# Patient Record
Sex: MALE | URBAN - METROPOLITAN AREA
[De-identification: names, ages, dates, MRNs, and addresses within clinical notes are randomized per-mention and may not be internally consistent; named-entity substitution may affect disease eponyms.]

---

## 2020-02-27 ENCOUNTER — TELEPHONE (OUTPATIENT)
Dept: HEMATOLOGY/ONCOLOGY | Facility: HOSPITAL | Age: 70
End: 2020-02-27

## 2020-02-27 NOTE — TELEPHONE ENCOUNTER
Pedro Cohen called and was looking get scheduled with Dr. Steffany Chávez for Leukemia. He is going to reach out to the [place] and have records sent over to us.  I informed him once we received them we will reach out to get him scheduled

## 2020-03-31 ENCOUNTER — TELEPHONE (OUTPATIENT)
Dept: INTERNAL MEDICINE CLINIC | Facility: CLINIC | Age: 70
End: 2020-03-31

## 2020-03-31 NOTE — TELEPHONE ENCOUNTER
Patient received a call today to call back and reschedule his 4/16/20 appointment. He agreed to cancel his appointment and declined to schedule a future appointment at this time.  He plans to call back after 5/1/20 to schedule it

## 2021-03-21 PROBLEM — E66.813 CLASS 3 SEVERE OBESITY IN ADULT: Status: ACTIVE | Noted: 2021-03-21

## 2021-03-21 PROBLEM — M15.0 PRIMARY OSTEOARTHRITIS INVOLVING MULTIPLE JOINTS: Status: ACTIVE | Noted: 2021-03-21

## 2021-03-21 PROBLEM — F10.20 ALCOHOLISM (HCC): Status: ACTIVE | Noted: 2021-03-21

## 2021-03-21 PROBLEM — Z86.19: Status: ACTIVE | Noted: 2021-03-21

## 2021-03-21 PROBLEM — Z85.6 PERSONAL HISTORY OF CLL (CHRONIC LYMPHOCYTIC LEUKEMIA): Status: ACTIVE | Noted: 2021-03-21

## 2021-03-21 PROBLEM — Z87.891 HISTORY OF TOBACCO USE: Status: ACTIVE | Noted: 2021-03-21

## 2021-03-21 PROBLEM — E66.813 CLASS 3 SEVERE OBESITY IN ADULT (HCC): Status: ACTIVE | Noted: 2021-03-21

## 2021-03-21 PROBLEM — I10 ESSENTIAL HYPERTENSION: Status: ACTIVE | Noted: 2021-03-21

## 2021-03-21 PROBLEM — E66.01 CLASS 3 SEVERE OBESITY IN ADULT (HCC): Status: ACTIVE | Noted: 2021-03-21

## 2021-03-21 PROBLEM — F41.1 GENERALIZED ANXIETY DISORDER: Status: ACTIVE | Noted: 2021-03-21

## 2021-03-21 PROBLEM — M15.9 PRIMARY OSTEOARTHRITIS INVOLVING MULTIPLE JOINTS: Status: ACTIVE | Noted: 2021-03-21

## 2025-04-03 ENCOUNTER — APPOINTMENT (OUTPATIENT)
Dept: GENERAL RADIOLOGY | Facility: HOSPITAL | Age: 75
End: 2025-04-03
Attending: EMERGENCY MEDICINE
Payer: MEDICARE

## 2025-04-03 ENCOUNTER — HOSPITAL ENCOUNTER (INPATIENT)
Facility: HOSPITAL | Age: 75
LOS: 4 days | Discharge: HOME | End: 2025-04-07
Attending: EMERGENCY MEDICINE | Admitting: INTERNAL MEDICINE
Payer: MEDICARE

## 2025-04-03 DIAGNOSIS — S81.802S LEG WOUND, LEFT, SEQUELA: ICD-10-CM

## 2025-04-03 DIAGNOSIS — L03.90 CELLULITIS, UNSPECIFIED CELLULITIS SITE: Primary | ICD-10-CM

## 2025-04-03 DIAGNOSIS — S81.801S LEG WOUND, RIGHT, SEQUELA: ICD-10-CM

## 2025-04-03 LAB
ANION GAP SERPL CALC-SCNC: 8 MMOL/L (ref 0–18)
BASOPHILS # BLD: 0 X10(3) UL (ref 0–0.2)
BASOPHILS NFR BLD: 0 %
BUN BLD-MCNC: 17 MG/DL (ref 9–23)
BUN/CREAT SERPL: 14.3 (ref 10–20)
CALCIUM BLD-MCNC: 9.5 MG/DL (ref 8.7–10.4)
CHLORIDE SERPL-SCNC: 101 MMOL/L (ref 98–112)
CO2 SERPL-SCNC: 28 MMOL/L (ref 21–32)
CREAT BLD-MCNC: 1.19 MG/DL
DEPRECATED RDW RBC AUTO: 52.1 FL (ref 35.1–46.3)
EGFRCR SERPLBLD CKD-EPI 2021: 65 ML/MIN/1.73M2 (ref 60–?)
EOSINOPHIL # BLD: 0.73 X10(3) UL (ref 0–0.7)
EOSINOPHIL NFR BLD: 1 %
ERYTHROCYTE [DISTWIDTH] IN BLOOD BY AUTOMATED COUNT: 16.1 % (ref 11–15)
GLUCOSE BLD-MCNC: 94 MG/DL (ref 70–99)
HCT VFR BLD AUTO: 41.4 %
HGB BLD-MCNC: 13.2 G/DL
INR BLD: 1.02 (ref 0.8–1.2)
LACTATE SERPL-SCNC: 1.4 MMOL/L (ref 0.5–2)
LYMPHOCYTES NFR BLD: 61.57 X10(3) UL (ref 1–4)
LYMPHOCYTES NFR BLD: 84 %
MCH RBC QN AUTO: 28 PG (ref 26–34)
MCHC RBC AUTO-ENTMCNC: 31.9 G/DL (ref 31–37)
MCV RBC AUTO: 87.9 FL
MONOCYTES # BLD: 2.2 X10(3) UL (ref 0.1–1)
MONOCYTES NFR BLD: 3 %
NEUTROPHILS # BLD AUTO: 8.4 X10 (3) UL (ref 1.5–7.7)
NEUTROPHILS NFR BLD: 12 %
NEUTS HYPERSEG # BLD: 8.8 X10(3) UL (ref 1.5–7.7)
NT-PROBNP SERPL-MCNC: 530 PG/ML (ref ?–125)
OSMOLALITY SERPL CALC.SUM OF ELEC: 285 MOSM/KG (ref 275–295)
PLATELET # BLD AUTO: 328 10(3)UL (ref 150–450)
POTASSIUM SERPL-SCNC: 4.6 MMOL/L (ref 3.5–5.1)
PROTHROMBIN TIME: 14 SECONDS (ref 11.6–14.8)
RBC # BLD AUTO: 4.71 X10(6)UL
SODIUM SERPL-SCNC: 137 MMOL/L (ref 136–145)
TOTAL CELLS COUNTED BLD: 100
WBC # BLD AUTO: 73.3 X10(3) UL (ref 4–11)

## 2025-04-03 PROCEDURE — 99223 1ST HOSP IP/OBS HIGH 75: CPT | Performed by: INTERNAL MEDICINE

## 2025-04-03 PROCEDURE — 73590 X-RAY EXAM OF LOWER LEG: CPT | Performed by: EMERGENCY MEDICINE

## 2025-04-03 RX ORDER — HYDROCODONE BITARTRATE AND ACETAMINOPHEN 5; 325 MG/1; MG/1
1 TABLET ORAL EVERY 4 HOURS PRN
Status: DISCONTINUED | OUTPATIENT
Start: 2025-04-03 | End: 2025-04-04

## 2025-04-03 RX ORDER — ALBUTEROL SULFATE 90 UG/1
2 INHALANT RESPIRATORY (INHALATION) 3 TIMES DAILY PRN
Status: DISCONTINUED | OUTPATIENT
Start: 2025-04-03 | End: 2025-04-08

## 2025-04-03 RX ORDER — SENNOSIDES 8.6 MG
17.2 TABLET ORAL NIGHTLY PRN
Status: DISCONTINUED | OUTPATIENT
Start: 2025-04-03 | End: 2025-04-08

## 2025-04-03 RX ORDER — HYDROCODONE BITARTRATE AND ACETAMINOPHEN 5; 325 MG/1; MG/1
2 TABLET ORAL EVERY 4 HOURS PRN
Status: DISCONTINUED | OUTPATIENT
Start: 2025-04-03 | End: 2025-04-04

## 2025-04-03 RX ORDER — ACETAMINOPHEN 500 MG
500 TABLET ORAL EVERY 4 HOURS PRN
Status: DISCONTINUED | OUTPATIENT
Start: 2025-04-03 | End: 2025-04-08

## 2025-04-03 RX ORDER — VANCOMYCIN 2 GRAM/500 ML IN 0.9 % SODIUM CHLORIDE INTRAVENOUS
14 ONCE
Status: COMPLETED | OUTPATIENT
Start: 2025-04-03 | End: 2025-04-03

## 2025-04-03 RX ORDER — LOSARTAN POTASSIUM 100 MG/1
100 TABLET ORAL DAILY
Status: DISCONTINUED | OUTPATIENT
Start: 2025-04-03 | End: 2025-04-08

## 2025-04-03 RX ORDER — LORAZEPAM 1 MG/1
1 TABLET ORAL EVERY 6 HOURS PRN
COMMUNITY
Start: 2025-01-31

## 2025-04-03 RX ORDER — MORPHINE SULFATE 4 MG/ML
4 INJECTION, SOLUTION INTRAMUSCULAR; INTRAVENOUS EVERY 2 HOUR PRN
Status: DISCONTINUED | OUTPATIENT
Start: 2025-04-03 | End: 2025-04-04

## 2025-04-03 RX ORDER — FLUTICASONE PROPIONATE 50 MCG
2 SPRAY, SUSPENSION (ML) NASAL DAILY
COMMUNITY
Start: 2025-02-18

## 2025-04-03 RX ORDER — BISACODYL 10 MG
10 SUPPOSITORY, RECTAL RECTAL
Status: DISCONTINUED | OUTPATIENT
Start: 2025-04-03 | End: 2025-04-08

## 2025-04-03 RX ORDER — ACETAMINOPHEN 500 MG
1 TABLET ORAL 3 TIMES DAILY
COMMUNITY
Start: 2024-12-31

## 2025-04-03 RX ORDER — OXYCODONE HYDROCHLORIDE 5 MG/1
5 TABLET ORAL EVERY 4 HOURS PRN
Status: DISCONTINUED | OUTPATIENT
Start: 2025-04-03 | End: 2025-04-08

## 2025-04-03 RX ORDER — ONDANSETRON 2 MG/ML
4 INJECTION INTRAMUSCULAR; INTRAVENOUS EVERY 6 HOURS PRN
Status: DISCONTINUED | OUTPATIENT
Start: 2025-04-03 | End: 2025-04-08

## 2025-04-03 RX ORDER — GABAPENTIN 300 MG/1
300 CAPSULE ORAL 3 TIMES DAILY
Status: DISCONTINUED | OUTPATIENT
Start: 2025-04-03 | End: 2025-04-08

## 2025-04-03 RX ORDER — TRIAMCINOLONE ACETONIDE 1 MG/G
1 CREAM TOPICAL 2 TIMES DAILY
COMMUNITY
Start: 2024-11-12

## 2025-04-03 RX ORDER — CYCLOBENZAPRINE HCL 10 MG
10 TABLET ORAL NIGHTLY PRN
Status: DISCONTINUED | OUTPATIENT
Start: 2025-04-03 | End: 2025-04-08

## 2025-04-03 RX ORDER — ALBUTEROL SULFATE 90 UG/1
2 INHALANT RESPIRATORY (INHALATION) 3 TIMES DAILY PRN
COMMUNITY
Start: 2024-09-03

## 2025-04-03 RX ORDER — CYCLOBENZAPRINE HCL 10 MG
10 TABLET ORAL NIGHTLY PRN
COMMUNITY
Start: 2024-09-25

## 2025-04-03 RX ORDER — LORAZEPAM 1 MG/1
1 TABLET ORAL EVERY 6 HOURS PRN
Status: DISCONTINUED | OUTPATIENT
Start: 2025-04-03 | End: 2025-04-08

## 2025-04-03 RX ORDER — MORPHINE SULFATE 2 MG/ML
2 INJECTION, SOLUTION INTRAMUSCULAR; INTRAVENOUS EVERY 2 HOUR PRN
Status: DISCONTINUED | OUTPATIENT
Start: 2025-04-03 | End: 2025-04-04

## 2025-04-03 RX ORDER — FLUTICASONE PROPIONATE 50 MCG
2 SPRAY, SUSPENSION (ML) NASAL
Status: DISCONTINUED | OUTPATIENT
Start: 2025-04-03 | End: 2025-04-08

## 2025-04-03 RX ORDER — PANTOPRAZOLE SODIUM 40 MG/1
40 TABLET, DELAYED RELEASE ORAL
Status: DISCONTINUED | OUTPATIENT
Start: 2025-04-04 | End: 2025-04-03

## 2025-04-03 RX ORDER — VITAMIN B COMPLEX
1 CAPSULE ORAL EVERY EVENING
COMMUNITY
Start: 2024-12-06

## 2025-04-03 RX ORDER — SODIUM PHOSPHATE, DIBASIC AND SODIUM PHOSPHATE, MONOBASIC 7; 19 G/230ML; G/230ML
1 ENEMA RECTAL ONCE AS NEEDED
Status: DISCONTINUED | OUTPATIENT
Start: 2025-04-03 | End: 2025-04-08

## 2025-04-03 RX ORDER — ACETAMINOPHEN 325 MG/1
650 TABLET ORAL EVERY 4 HOURS PRN
Status: DISCONTINUED | OUTPATIENT
Start: 2025-04-03 | End: 2025-04-04

## 2025-04-03 RX ORDER — FLUTICASONE PROPIONATE 50 MCG
2 SPRAY, SUSPENSION (ML) NASAL DAILY
Status: DISCONTINUED | OUTPATIENT
Start: 2025-04-03 | End: 2025-04-03

## 2025-04-03 RX ORDER — HEPARIN SODIUM 5000 [USP'U]/ML
5000 INJECTION, SOLUTION INTRAVENOUS; SUBCUTANEOUS EVERY 8 HOURS SCHEDULED
Status: DISCONTINUED | OUTPATIENT
Start: 2025-04-03 | End: 2025-04-08

## 2025-04-03 RX ORDER — VALSARTAN 160 MG/1
1 TABLET ORAL DAILY
COMMUNITY
Start: 2024-12-16

## 2025-04-03 RX ORDER — GABAPENTIN 300 MG/1
300 CAPSULE ORAL 3 TIMES DAILY
COMMUNITY
Start: 2025-01-02

## 2025-04-03 RX ORDER — CARBOXYMETHYLCELLULOSE SODIUM 5 MG/ML
1 SOLUTION/ DROPS OPHTHALMIC AS NEEDED
COMMUNITY
Start: 2024-09-25

## 2025-04-03 RX ORDER — PANTOPRAZOLE SODIUM 40 MG/1
40 TABLET, DELAYED RELEASE ORAL EVERY EVENING
Status: DISCONTINUED | OUTPATIENT
Start: 2025-04-03 | End: 2025-04-08

## 2025-04-03 RX ORDER — POLYETHYLENE GLYCOL 3350 17 G/17G
17 POWDER, FOR SOLUTION ORAL DAILY PRN
Status: DISCONTINUED | OUTPATIENT
Start: 2025-04-03 | End: 2025-04-08

## 2025-04-03 NOTE — ED INITIAL ASSESSMENT (HPI)
Brought in by EMS with complaint of infected leg ulcer. Wound on bilateral foot since 1 year. Pt thinks the infection is spreading, redness is spreading right up to his right  lower leg. Hx of HTN, CLL. Ambulatory. A/O x4.

## 2025-04-03 NOTE — ED QUICK NOTES
Orders for admission, patient is aware of plan and ready to go upstairs. Any questions, please call ED SANDRA Xiong  at extension [phone].     Patient Covid vaccination status: Unvaccinated     COVID Test Ordered in ED: None    COVID Suspicion at Admission: N/A    Running Infusions:      Mental Status/LOC at time of transport: A&ox4    Other pertinent information:   CIWA score: N/A   NIH score:  N/A

## 2025-04-03 NOTE — H&P
[facility]  part of [facility]  HISTORY AND PHYSICAL       Tesfaye Cobos Patient Status:  Emergency    1950  74 year old CSN [id number]   Location  Attending Altagracia Beverly MD     PCP None Pcp         DATE OF ADMISSION: 4/3/2025     CHIEF COMPLAINT: Lower extremity pain    HISTORY OF PRESENT ILLNESS  This is a 74 year oldmale who presented complaining of lower extremity pain and swelling.  Patient states he has been having difficulties with lower extremity swelling and cellulitis.  [facility]  Over the past several days patient believed his right lower extremity was looking worse with increasing redness and pain prompting visit to ED for further evaluation.  Patient denied subjective fevers or chills.  Patient otherwise denies current chest pain, shortness of breath, abdominal pain, nausea vomiting.  Of note, patient has history of CLL for which she follows with hematology oncology as outpatient.    PAST MEDICAL HISTORY  Past Medical History:    Alcoholism ([facility])    Class 3 severe obesity in adult ([facility])    2015 70 inches, 275.4 pounds, BMI 40    Generalized anxiety disorder    History of lice    History of tobacco use    Personal history of CLL (chronic lymphocytic leukemia)    Primary osteoarthritis involving multiple joints        PAST SURGICAL HISTORY  History reviewed. No pertinent surgical history.    ALLERGIES   Patient has no known allergies.    MEDICATIONS  Patient's Medications   New Prescriptions    No medications on file   Previous Medications    VALSARTAN 160 MG ORAL TAB    Take 1 tablet (160 mg total) by mouth daily.   Modified Medications    No medications on file   Discontinued Medications    No medications on file       SOCIAL HISTORY  Social History     Socioeconomic History    Marital status: Single       FAMILY HISTORY  History reviewed. No pertinent family history.    PHYSICAL EXAM  Vital  signs:  BP (!) 182/96   Pulse 102   Temp 98.4 °F (36.9 °C) (Oral)   Resp 22   Ht 5' 10\" (1.778 m)   Wt (!) 310 lb (140.6 kg)   SpO2 95%   BMI 44.48 kg/m²      GENERAL:  Awake and alert, in no acute distress.  HEART:  Regular rhythm.  Tachycardia  LUNGS:  Air entry was minimally decreased.  No increased work of breathing or wheezes   ABDOMEN: Soft and non-tender.    EXTREMITIES: Bilateral extremity edema.  Scaling of left foot.  Erythema and warmth of right lower extremity.  PSYCHIATRIC: Normal mood      IMAGING    XR TIBIA + FIBULA (2 VIEWS), LEFT (CPT=73590)    Result Date: 4/3/2025  CONCLUSION: Degenerative changes and soft tissue swelling without acute fracture or dislocation.     Dictated by (CST): Dago Ledezma MD on 4/03/2025 at 10:54 AM     Finalized by (CST): Dago Ledezma MD on 4/03/2025 at 10:54 AM          XR TIBIA + FIBULA (2 VIEWS), RIGHT (CPT=73590)    Result Date: 4/3/2025  CONCLUSION:   Degenerative changes and soft tissue swelling without acute fracture or dislocation.      Dictated by (CST): Dago Ledezma MD on 4/03/2025 at 10:52 AM     Finalized by (CST): Dago Ledezma MD on 4/03/2025 at 10:53 AM            Data:  Recent Labs   Lab 04/03/25  0653   RBC 4.71   HGB 13.2   HCT 41.4   MCV 87.9   MCH 28.0   MCHC 31.9   RDW 16.1*   NEPRELIM 8.40*   WBC 73.3*   .0     Recent Labs   Lab 04/03/25  0653   GLU 94   BUN 17   CREATSERUM 1.19   CA 9.5      K 4.6      CO2 28.0       ASSESSMENT/PLAN      Cellulitis, unspecified cellulitis site  -Patient with ongoing and progressive lower extremity swelling, erythema and warmth.  -Patient states he was recently hospitalized and treated for left lower extremity cellulitis.  -Patient noted development of worsening redness and swelling of right lower extremity  -X-rays reviewed.  Noted soft tissue swelling.  -Starting broad-spectrum antibiotics   -ID consulted, appreciate further recommendations  -Local wound care     Reported history of  restrictive lung disease  -No current signs of acute exacerbation  -Patient describes some mild dyspnea with exertion  -Continue albuterol inhaler as needed  -Continue Flonase  -Continue to monitor    HTN  -Elevations noted  -Restart home ARB  - Monitor and adjust as needed      History of CLL  -Reports he follows with hematology/oncology as outpatient  -WBC of 73      Plan of care discussed with patient at bedside.  Discussed with ED physician and RN. Decision made that pt needs hospitalization for further management/monitoring.      Willam Hough MD    This note was prepared using Dragon Medical voice recognition dictation software. As a result errors may occur. When identified these errors have been corrected. While every attempt is made to correct errors during dictation discrepancies may still exist

## 2025-04-03 NOTE — PLAN OF CARE
Patient arrived to Room from ED.  Oriented to room, caregivers, and functions of unit.  Discussed plan of care for day, including all given medications and their indications. IV Vancomycin and Zosyn maintained.  Redness and edema to bilateral legs noted with chronic wounds per patient.  After multiple attempts, patient did not allow this RN to remove dressings to trace and assess wounds.  Wound consult in place.  Comfort measures encouraged to promote restful environment.     Problem: Patient Centered Care  Goal: Patient preferences are identified and integrated in the patient's plan of care  Description: Interventions:- What would you like us to know as we care for you? I usually get [facility] care at the [facility]    - Provide timely, complete, and accurate information to patient/family  - Incorporate patient and family knowledge, values, beliefs, and cultural backgrounds into the planning and delivery of care  - Encourage patient/family to participate in care and decision-making at the level they choose  - Honor patient and family perspectives and choices  Outcome: Progressing     Problem: Patient/Family Goals  Goal: Patient/Family Long Term Goal  Description: Patient's Long Term Goal: Discharge from hospital    Interventions:  - Inpatient wound consult  - Transition to appropriate discharge regimen for antibiotic therapy  - Improve redness to bilateral legs  - See additional Care Plan goals for specific interventions  Outcome: Progressing  Goal: Patient/Family Short Term Goal  Description: Patient's Short Term Goal: Manage cellulitis to legs    Interventions:   - Infectious disease consultation as indicated  - Maintain IV antibiotic therapy as ordered  - Continue dressing changes to legs per routine  - See additional Care Plan goals for specific interventions  Outcome: Progressing     Problem: PAIN - ADULT  Goal: Verbalizes/displays adequate comfort level or patient's stated pain goal  Description: INTERVENTIONS:-  Encourage pt to monitor pain and request assistance- Assess pain using appropriate pain scale- Administer analgesics based on type and severity of pain and evaluate response- Implement non-pharmacological measures as appropriate and evaluate response- Consider cultural and social influences on pain and pain management- Manage/alleviate anxiety- Utilize distraction and/or relaxation techniques- Monitor for opioid side effects- Notify MD/LIP if interventions unsuccessful or patient reports new pain- Anticipate increased pain with activity and pre-medicate as appropriate  Outcome: Progressing     Problem: RISK FOR INFECTION - ADULT  Goal: Absence of fever/infection during anticipated neutropenic period  Description: INTERVENTIONS- Monitor WBC- Administer growth factors as ordered- Implement neutropenic guidelines  Outcome: Progressing     Problem: SAFETY ADULT - FALL  Goal: Free from fall injury  Description: INTERVENTIONS:- Assess pt frequently for physical needs- Identify cognitive and physical deficits and behaviors that affect risk of falls.- [place] fall precautions as indicated by assessment.- Educate pt/family on patient safety including physical limitations- Instruct pt to call for assistance with activity based on assessment- Modify environment to reduce risk of injury- Provide assistive devices as appropriate- Consider OT/PT consult to assist with strengthening/mobility- Encourage toileting schedule  Outcome: Progressing     Problem: DISCHARGE PLANNING  Goal: Discharge to home or other facility with appropriate resources  Description: INTERVENTIONS:- Identify barriers to discharge w/pt and caregiver- Include patient/family/discharge partner in discharge planning- Arrange for needed discharge resources and transportation as appropriate- Identify discharge learning needs (meds, wound care, etc)- Arrange for interpreters to assist at discharge as needed- Consider post-discharge preferences of  patient/family/discharge partner- Complete POLST form as appropriate- Assess patient's ability to be responsible for managing their own health- Refer to Case Management Department for coordinating discharge planning if the patient needs post-hospital services based on physician/LIP order or complex needs related to functional status, cognitive ability or social support system  Outcome: Progressing     Problem: SKIN/TISSUE INTEGRITY - ADULT  Goal: Skin integrity remains intact  Description: INTERVENTIONS- Assess and document risk factors for pressure ulcer development- Assess and document skin integrity- Monitor for areas of redness and/or skin breakdown- Initiate interventions, skin care algorithm/standards of care as needed  Outcome: Progressing  Goal: Incision(s), wounds(s) or drain site(s) healing without S/S of infection  Description: INTERVENTIONS:- Assess and document risk factors for pressure ulcer development- Assess and document skin integrity- Assess and document dressing/incision, wound bed, drain sites and surrounding tissue- Implement wound care per orders- Initiate isolation precautions as appropriate- Initiate Pressure Ulcer prevention bundle as indicated  Outcome: Progressing     All efforts maintained to promote infection prevention during my assessment and care for this patient.  Stethoscope cleansed and hand hygiene strictly maintained.

## 2025-04-03 NOTE — CONSULTS
[facility]  part of [facility] ID CONSULT NOTE    Tesfaye Cobos Patient Status:  Emergency    1950 MRN [id number]   Location [facility] EMERGENCY DEPARTMENT Attending Altagracia Beverly MD   Hosp Day # 0 PCP None Pcp       Reason for Consultation:  RLE cellulitis    ASSESSMENT:    Antibiotics: Vancomycin, zosyn    # Acute RLE cellulitis in setting of BLE wounds  # CLL    PLAN:  -  Continue on vancomycin and zosyn.  -  Local wound care.  -  Follow fever curve, wbc.  -  Reviewed labs, micro, imaging reports, available old records.  -  Case d/w patient, RN.    History of Present Illness:  Tesfaye Cobos is a 74 year old male with a history of obesity, CLL and reports not currently on treatment, [facility] with a history of hospitalization for LLE cellulitis, who presented to [facility] ED on 4/3 with worsening pain in his legs. States that the wound developed on his right leg about a month ago. Does wear compression wraps. Was putting mupirocin cream on his legs. On arrival, afebrile, XR B tibia with soft tissue swelling,  started on vancomycin and zosyn. ID consulted.    History:  Past Medical History:    Alcoholism ([facility])    Class 3 severe obesity in adult ([facility])    2015 70 inches, 275.4 pounds, BMI 40    Generalized anxiety disorder    History of lice    History of tobacco use    Personal history of CLL (chronic lymphocytic leukemia)    Primary osteoarthritis involving multiple joints     History reviewed. No pertinent surgical history.  History reviewed. No pertinent family history.       Allergies:  Allergies[1]    Medications:    Current Facility-Administered Medications:     vancomycin (Vancocin) 2 g in sodium chloride 0.9% 500mL IVPB premix, 14 mg/kg, Intravenous, Once    Review of Systems:  CONSTITUTIONAL:  No weight loss, weakness or fatigue.  HEENT:  Eyes:  No visual loss, blurred vision, double vision or yellow sclerae. Ears, Nose, Throat:  No hearing  loss, sneezing, congestion, runny nose or sore throat.  SKIN:  No rash or itching.  CARDIOVASCULAR:  No chest pain, chest pressure or chest discomfort  RESPIRATORY:  No shortness of breath, cough or sputum.  GASTROINTESTINAL:  No anorexia, nausea, vomiting or diarrhea. No abdominal pain or blood.  GENITOURINARY:  No Burning on urination.   NEUROLOGICAL:  No headache, dizziness, syncope, paralysis, ataxia, numbness or tingling in the extremities.  MUSCULOSKELETAL:  +BLE pain  HEMATOLOGIC:  No anemia, bleeding or bruising.  ALLERGIES:  No history of asthma, hives, eczema or rhinitis.    Physical Exam:  Vital signs: Blood pressure (!) 182/96, pulse 102, temperature 98.4 °F (36.9 °C), temperature source Oral, resp. rate 22, height 5' 10\" (1.778 m), weight (!) 310 lb (140.6 kg), SpO2 95%.    General: Awake, in bed  HEENT: Moist mucous membranes.   Neck: No lymphadenopathy.  Supple.  Cardiovascular: RRR  Respiratory: CTAB  Abdomen: Soft, nontender, nondistended.   Musculoskeletal: BLE edema, RLE with erythema, warmth, BLE wrapped  Integument: No lesions. No erythema.  Lines: PIV+    Laboratory Data:  Recent Labs   Lab 04/03/25  0653   RBC 4.71   HGB 13.2   HCT 41.4   MCV 87.9   MCH 28.0   MCHC 31.9   RDW 16.1*   NEPRELIM 8.40*   WBC 73.3*   .0   NEUT 12   LYMPH 84   MON 3   EOS 1     Recent Labs   Lab 04/03/25  0653   GLU 94   BUN 17   CREATSERUM 1.19   CA 9.5      K 4.6      CO2 28.0       Microbiology: Reviewed in EMR    Radiology: Reviewed    Thank you for allowing us to participate in the care of this patient. Please do not hesitate to call if you have any questions.   We will continue to follow with you and will make further recommendations based on his progress.    CELENA [facility]  [phone]  4/3/2025           [1] No Known Allergies

## 2025-04-03 NOTE — ED PROVIDER NOTES
Patient Seen in: [facility]         EMERGENCY DEPARTMENT NOTE    Dictated. Voice Transcription software has been utilized for this dictation (the reader should be aware that typographical errors are possible with voice transcription software and to please contact the dictating physician if there are questions.)         History     Chief Complaint   Patient presents with   • Rash Skin Problem     INFECTED LEG ULCER       There may be discrepancies from triage note.     HPI    History provided by patient.  74-year-old male, history of chronic leg wounds complaining of worsening leg wounds and pain.  Patient is a poor historian, and asking his medical history, he reports questionable diabetes.  Reports a history of CLL however states he is currently not receiving treatment a.  He states that 30% of diagnosis are incorrect.  Denies cold feet.  No fevers, chills, nausea, vomiting, diarrhea, constipation, cough, cold symptoms, urinary complaints.  No chest pain, shortness of breath  No headache, neck pain, neck stiffness, incontinence.  No changes in mentation, no changes in vision, no total/new extremity weakness, no total/new extremity paresthesia, no difficulty speaking.  No alleviating or exacerbating factors.  Denies orthopnea, pnd, change in exercise tolerance limited by chest pain/sob  Reports chronic leg swelling, no changes to the swelling.      History reviewed.   Past Medical History:   • Alcoholism ([facility])   • Class 3 severe obesity in adult ([facility])    11/6/2015 70 inches, 275.4 pounds, BMI 40   • Generalized anxiety disorder   • History of lice   • History of tobacco use   • Personal history of CLL (chronic lymphocytic leukemia)   • Primary osteoarthritis involving multiple joints       History reviewed. History reviewed. No pertinent surgical history.      Medications :  Prescriptions Prior to Admission[1]     History reviewed. No pertinent family history.    Smoking Status:   Social History      Socioeconomic History   • Marital status: Single       Review of Systems   Constitutional: Negative.    HENT: Negative.     Eyes: Negative.    Respiratory: Negative.     Cardiovascular: Negative.    Gastrointestinal: Negative.    Genitourinary: Negative.    Musculoskeletal: Negative.    Skin: Negative.    Neurological: Negative.    Endo/Heme/Allergies: Negative.    Psychiatric/Behavioral: Negative.     All other systems reviewed and are negative.    Pertinent positives as listed.  All other organ systems are reviewed and are negative.    Constitutional and vital signs reviewed.      Social History and Family History elements reviewed from today, pertinent positives to the presenting problem noted.    Physical Exam     ED Triage Vitals   BP 04/03/25 0647 (!) 177/97   Pulse 04/03/25 0647 102   Resp 04/03/25 0647 22   Temp 04/03/25 0647 98.4 °F (36.9 °C)   Temp src 04/03/25 0647 Oral   SpO2 04/03/25 0647 97 %   O2 Device 04/03/25 1200 None (Room air)       All measures to prevent infection transmission during my interaction with the patient were taken. The patient was already wearing a droplet mask on my arrival to the room. Personal protective equipment including droplet mask, eye protection, and gloves were worn throughout the duration of the exam.  Handwashing was performed prior to and after the exam.  Stethoscope and any equipment used during my examination was cleaned with super sani-cloth germicidal wipes following the exam.     Physical Exam  Vitals and nursing note reviewed.   Constitutional:       General: He is not in acute distress.     Appearance: He is obese. He is not ill-appearing or toxic-appearing.   Cardiovascular:      Rate and Rhythm: Normal rate and regular rhythm.      Comments: Warm feet bilaterally dorsalis pedis pulse is not palpable however dopplerable;     HR 89  Pulmonary:      Effort: Pulmonary effort is normal. No respiratory distress.      Breath sounds: No stridor. No wheezing,  rhonchi or rales.   Chest:      Chest wall: No tenderness.   Abdominal:      General: There is no distension.      Palpations: Abdomen is soft.      Tenderness: There is no abdominal tenderness. There is no guarding or rebound.      Comments: Negative Newton sign, negative McBurney's point tenderness     Musculoskeletal:      Right lower leg: Edema present.      Left lower leg: Edema present.      Comments: Soft compartments of bilateral lower extremities.  Symmetrical lower extremity edema with chronic skin color changes suggestive of chronic vascular disease.   Skin:     Capillary Refill: Capillary refill takes less than 2 seconds.      Comments: Chronic skin color changes noted to lower extremities       RLE: superficial ulcers noted to right anterior leg with erythema + warmth, no crepitus, no skin sloughing  LLE: Anterior shin ulcer which appears to have good granulation tissue.  No warmth, no erythema, no crepitus   Neurological:      Mental Status: He is alert.      Comments: Gross motor and sensory function intact symmetrically and bilaterally to upper extremities and lower extremities.   Psychiatric:         Mood and Affect: Mood normal.         Behavior: Behavior normal.         Review of prior notes in Care everywhere/Epic performed by myself:  No recent ER visits.      ED Course     If labs obtained, they are personally reviewed by myself:     Labs Reviewed   CBC WITH DIFFERENTIAL WITH PLATELET - Abnormal; Notable for the following components:       Result Value    WBC 73.3 (*)     RDW-SD 52.1 (*)     RDW 16.1 (*)     Neutrophil Absolute Prelim 8.40 (*)     All other components within normal limits   MANUAL DIFFERENTIAL - Abnormal; Notable for the following components:    Neutrophil Absolute Manual 8.80 (*)     Lymphocyte Absolute Manual 61.57 (*)     Monocyte Absolute Manual 2.20 (*)     Eosinophil Absolute Manual 0.73 (*)     All other components within normal limits   BASIC METABOLIC PANEL (8) -  Normal   PROTHROMBIN TIME (PT) - Normal   SCAN SLIDE   MD BLOOD SMEAR CONSULT   LACTIC ACID, PLASMA   PRO BETA NATRIURETIC PEPTIDE   PRO BETA NATRIURETIC PEPTIDE   RAINBOW DRAW LAVENDER   RAINBOW DRAW LIGHT GREEN   RAINBOW DRAW BLUE   RAINBOW DRAW GOLD   BLOOD CULTURE   BLOOD CULTURE       If radiologic studies ordered during today's ER visit, my independent interpretation are seen directly below.  This is awaiting the radiologist's final interpretation.  TR ibia/fibula x-ray,independent interpretation of radiologic study completed by myself and awaiting formal radiologist interpretation:  No acute fracture or dislocation  L Tibia/fibula x-ray,independent interpretation of radiologic study completed by myself and awaiting formal radiologist interpretation:  No acute fracture or dislocation      Imaging Results read by radiology in ED: XR TIBIA + FIBULA (2 VIEWS), LEFT (CPT=73590)    Result Date: 4/3/2025  CONCLUSION: Degenerative changes and soft tissue swelling without acute fracture or dislocation.     Dictated by (CST): Dago Ledezma MD on 4/03/2025 at 10:54 AM     Finalized by (CST): Dago Ledezma MD on 4/03/2025 at 10:54 AM          XR TIBIA + FIBULA (2 VIEWS), RIGHT (CPT=73590)    Result Date: 4/3/2025  CONCLUSION:   Degenerative changes and soft tissue swelling without acute fracture or dislocation.      Dictated by (CST): Dago Ledezma MD on 4/03/2025 at 10:52 AM     Finalized by (CST): Dago Ledezma MD on 4/03/2025 at 10:53 AM               ED Medications Administered:   Medications   vancomycin (Vancocin) 2 g in sodium chloride 0.9% 500mL IVPB premix (2,000 mg Intravenous New Bag 4/3/25 1146)   sodium chloride 0.9 % IV bolus 1,000 mL (has no administration in time range)   piperacillin-tazobactam (Zosyn) 4.5 g in dextrose 5% 100 mL IVPB-ADDV (0 g Intravenous Stopped 4/3/25 1046)           Vitals:    04/03/25 0647 04/03/25 1200 04/03/25 1206   BP: (!) 177/97 (!) 184/99 (!) 182/96   Pulse: 102 101    Resp: 22 19  22   Temp: 98.4 °F (36.9 °C)     TempSrc: Oral     SpO2: 97% 95% 95%   Weight: (!) 140.6 kg     Height: 177.8 cm (5' 10\")       *I personally reviewed and interpreted all ED vitals.    Pulse Ox interpretation by myself: 95%, Room air, Normal     Monitor Interpretation by myself:   normal sinus rhythm    If Ekg obtained during today's visit, it is independently interpreted by myself directly below:      Medical Record Review: I personally reviewed available prior medical records for any recent pertinent discharge summaries, testing, and procedures and reviewed those reports.      [facility]     Medical decision making/ED Course:   74-year-old male with chronic bilateral lower extremity leg wounds who presents to the ER for pain to his legs with worsening wounds.  Right lower extremity with clear cellulitis with overlying erythema and warmth.  Small superficial ulcers noted.  He has a history of CLL.  He reports chronic none crescendoing swelling of his bilateral lower extremities.  I doubt DVT.  He denies chest pain, shortness of breath, no hypoxia to suggest PE.  White blood cell count elevated at 73,000-likely a mixed picture given his known CLL and infection noted today.  Sepsis seems less likely.  Heart rate noted to be sinus tachycardia, low 100s later during patient's emergency room stay the therefore blood cultures and lactic acid ordered.  Has chronic lower extremity edema, will hold 30 cc/kg bolus given fluid overload state.  Will order BNP and will give 1 L of IV fluids at a slow rate.  Tib-fib regions x-rayed with no signs of osteomyelitis.  Patient given vancomycin and Zosyn.      BMP within normal limits.  INR normal.  Case discussed with hospitalist and infectious disease physician who agrees with ER management.    Patient is non toxic appearing, is in no distress, hemodynamically stable.  Pt agrees and is aware of plan.             1345: Sepsis reevaluation completed.  Good cap refill  Differential Diagnosis:   as listed above in medical decision making.   *Please note that in the presenting to the emergency department, illness/injury that poses a threat to life or function is considered during this patient's initial evaluation.    The complexity of this visit is therefore inherently more complex given the need to consider life threatening pathology prior to any other etiology for this patient's visit.    The differential diagnosis and medical decision above exemplify this rationale.       Medical Decision Making  Problems Addressed:  Cellulitis, unspecified cellulitis site: acute illness or injury  Leg wound, left, sequela: acute illness or injury  Leg wound, right, sequela: acute illness or injury    Amount and/or Complexity of Data Reviewed  External Data Reviewed: notes.  Labs: ordered. Decision-making details documented in ED Course.  Radiology: ordered and independent interpretation performed. Decision-making details documented in ED Course.  Discussion of management or test interpretation with external provider(s): Hospitalist  Infectious disease physician    Risk  Drug therapy requiring intensive monitoring for toxicity.  Decision regarding hospitalization.    Critical Care  Total time providing critical care: 33 minutes        ED Course as of 04/03/25 1312  ------------------------------------------------------------  Time: 04/03 0733  Comment: Patient reports his white blood cell count chronically being elevated in the 80,000's due to his CLL.     ------------------------------------------------------------  Time: 04/03 0800  Comment: White blood cell count 73,000.  Likely from patient's known CLL.  Possible component of cellulitis noted on exam.  No fever, no tachypnea, respiratory rate less than 20, heart rate less than 90 will hold septic workup.  ------------------------------------------------------------  Time: 04/03 1310  Comment: Upon reevaluation, heart rate noted to be 107, respiratory rate of 19.   Will send blood cultures and lactic acid.  Given lower extremity edema, will hold 30 cc/kg bolus to prevent further fluid overload.  Will give 1 L of IV fluids at slow rate.  Will add proBNP and chest x-ray to evaluate for fluid overload       Vitals:    04/03/25 0647 04/03/25 1200 04/03/25 1206   BP: (!) 177/97 (!) 184/99 (!) 182/96   Pulse: 102 101    Resp: 22 19 22   Temp: 98.4 °F (36.9 °C)     TempSrc: Oral     SpO2: 97% 95% 95%   Weight: (!) 140.6 kg     Height: 177.8 cm (5' 10\")               Complicating Factors: Significant medical problems that contribute to the complexity of this emergency room evaluation is listed above.    Condition upon leaving the department: Stable    Disposition and Plan     Clinical Impression:  1. Cellulitis, unspecified cellulitis site    2. Leg wound, left, sequela    3. Leg wound, right, sequela        Disposition:  Admit    Medications Prescribed:  Current Discharge Medication List          I have discussed the discharge plan with the patient and/or family or well wisher present in the room with the patient's permission.  They state that they understand and agree with the plan.  All questions regarding their care have been answered prior to discharge.  They are aware: Emergency Department is not intended to be a substitute for an effort to provide complete medical care. The imaging, if any, have often been interpreted on a preliminary basis pending final reading by the radiologist.  Instructed to return immediately to the ED if any changes or worsening of condition should occur.  If patient's blood pressure was greater than 140/90 today, patient encouraged to have this blood pressure rechecked with primary MD and blood pressure education provided.        Hospital Problems       Present on Admission             ICD-10-CM Noted POA    * (Principal) Cellulitis, unspecified cellulitis site L03.90 4/3/2025 Unknown                   [1] (Not in a hospital admission)

## 2025-04-04 LAB
ALBUMIN SERPL-MCNC: 4.6 G/DL (ref 3.2–4.8)
ALBUMIN/GLOB SERPL: 2 {RATIO} (ref 1–2)
ALP LIVER SERPL-CCNC: 109 U/L
ALT SERPL-CCNC: 23 U/L
ANION GAP SERPL CALC-SCNC: 8 MMOL/L (ref 0–18)
AST SERPL-CCNC: 18 U/L (ref ?–34)
BASOPHILS # BLD AUTO: 0.12 X10(3) UL (ref 0–0.2)
BASOPHILS NFR BLD AUTO: 0.2 %
BILIRUB SERPL-MCNC: 0.7 MG/DL (ref 0.2–1.1)
BUN BLD-MCNC: 19 MG/DL (ref 9–23)
BUN/CREAT SERPL: 15.2 (ref 10–20)
CALCIUM BLD-MCNC: 9.4 MG/DL (ref 8.7–10.4)
CHLORIDE SERPL-SCNC: 103 MMOL/L (ref 98–112)
CO2 SERPL-SCNC: 27 MMOL/L (ref 21–32)
CREAT BLD-MCNC: 1.25 MG/DL
DEPRECATED RDW RBC AUTO: 53 FL (ref 35.1–46.3)
EGFRCR SERPLBLD CKD-EPI 2021: 61 ML/MIN/1.73M2 (ref 60–?)
EOSINOPHIL # BLD AUTO: 0.24 X10(3) UL (ref 0–0.7)
EOSINOPHIL NFR BLD AUTO: 0.4 %
ERYTHROCYTE [DISTWIDTH] IN BLOOD BY AUTOMATED COUNT: 16.4 % (ref 11–15)
GLOBULIN PLAS-MCNC: 2.3 G/DL (ref 2–3.5)
GLUCOSE BLD-MCNC: 113 MG/DL (ref 70–99)
HCT VFR BLD AUTO: 39.7 %
HGB BLD-MCNC: 13 G/DL
IMM GRANULOCYTES # BLD AUTO: 0.17 X10(3) UL (ref 0–1)
IMM GRANULOCYTES NFR BLD: 0.3 %
LYMPHOCYTES # BLD AUTO: 55.63 X10(3) UL (ref 1–4)
LYMPHOCYTES NFR BLD AUTO: 84.1 %
MAGNESIUM SERPL-MCNC: 2.2 MG/DL (ref 1.6–2.6)
MCH RBC QN AUTO: 29 PG (ref 26–34)
MCHC RBC AUTO-ENTMCNC: 32.7 G/DL (ref 31–37)
MCV RBC AUTO: 88.4 FL
MONOCYTES # BLD AUTO: 2.25 X10(3) UL (ref 0.1–1)
MONOCYTES NFR BLD AUTO: 3.4 %
NEUTROPHILS # BLD AUTO: 7.74 X10 (3) UL (ref 1.5–7.7)
NEUTROPHILS # BLD AUTO: 7.74 X10(3) UL (ref 1.5–7.7)
NEUTROPHILS NFR BLD AUTO: 11.6 %
OSMOLALITY SERPL CALC.SUM OF ELEC: 289 MOSM/KG (ref 275–295)
PLATELET # BLD AUTO: 300 10(3)UL (ref 150–450)
POTASSIUM SERPL-SCNC: 5 MMOL/L (ref 3.5–5.1)
PROT SERPL-MCNC: 6.9 G/DL (ref 5.7–8.2)
RBC # BLD AUTO: 4.49 X10(6)UL
SODIUM SERPL-SCNC: 138 MMOL/L (ref 136–145)
WBC # BLD AUTO: 66.2 X10(3) UL (ref 4–11)

## 2025-04-04 PROCEDURE — 99233 SBSQ HOSP IP/OBS HIGH 50: CPT | Performed by: INTERNAL MEDICINE

## 2025-04-04 PROCEDURE — G0316 PROLNG IP/OBS E/M EA ADDL 15 MIN: HCPCS | Performed by: INTERNAL MEDICINE

## 2025-04-04 RX ORDER — SILVER SULFADIAZINE 10 MG/G
CREAM TOPICAL DAILY
Status: DISCONTINUED | OUTPATIENT
Start: 2025-04-04 | End: 2025-04-08

## 2025-04-04 RX ORDER — SULFAMETHOXAZOLE AND TRIMETHOPRIM 800; 160 MG/1; MG/1
1 TABLET ORAL 2 TIMES DAILY
Qty: 14 TABLET | Refills: 0 | Status: SHIPPED | OUTPATIENT
Start: 2025-04-04 | End: 2025-04-06

## 2025-04-04 RX ORDER — IBUPROFEN 400 MG/1
400 TABLET, FILM COATED ORAL 3 TIMES DAILY
Status: DISCONTINUED | OUTPATIENT
Start: 2025-04-04 | End: 2025-04-04

## 2025-04-04 RX ORDER — DICLOFENAC EPOLAMINE 0.01 G/1
1 SYSTEM TOPICAL EVERY 12 HOURS
Status: DISCONTINUED | OUTPATIENT
Start: 2025-04-04 | End: 2025-04-04

## 2025-04-04 RX ORDER — CLOTRIMAZOLE AND BETAMETHASONE DIPROPIONATE 10; .64 MG/G; MG/G
CREAM TOPICAL 2 TIMES DAILY
Status: DISCONTINUED | OUTPATIENT
Start: 2025-04-04 | End: 2025-04-08

## 2025-04-04 RX ORDER — ACETAMINOPHEN 500 MG
1000 TABLET ORAL EVERY 8 HOURS PRN
Status: DISCONTINUED | OUTPATIENT
Start: 2025-04-04 | End: 2025-04-08

## 2025-04-04 RX ORDER — IBUPROFEN 600 MG/1
600 TABLET, FILM COATED ORAL 3 TIMES DAILY
Status: DISCONTINUED | OUTPATIENT
Start: 2025-04-04 | End: 2025-04-05

## 2025-04-04 NOTE — PROGRESS NOTES
INFECTIOUS DISEASE PROGRESS NOTE  [facility]  part of [facility] ID PROGRESS NOTE    Tesfaye Cobos Patient Status:  Inpatient    1950 MRN [id number]   Location [facility] 1W Attending Willam Hough MD   Hosp Day # 1 PCP None Pcp     Subjective:  ROS reviewed. States that the night RN did not do the dressing changes correctly. Feels like he needs more cream on his legs and feet. Sitting in chair with legs down.    ASSESSMENT:    Antibiotics: Vancomycin, zosyn     # Acute RLE cellulitis in setting of BLE wounds   -FU blood cx  # CLL     PLAN:  -  Continue on vancomycin and zosyn. Will DC on PO augmentin and bactrim x 7 days.  -  Local wound care. Patient refused wound care RN evaluation this AM.  -  Follow fever curve, wbc.  -  Reviewed labs, micro, imaging reports, available old records.  -  Case d/w patient, pharmacy, RN.     History of Present Illness:  Tesfaye Cobos is a 74 year old male with a history of obesity, CLL and reports not currently on treatment, [facility], with a history of hospitalization for LLE cellulitis, who presented to [facility] ED on 4/3 with worsening pain in his legs. States that the wound developed on his right leg about a month ago. Does wear compression wraps. Was putting mupirocin cream on his legs. On arrival, afebrile, XR B tibia with soft tissue swelling,  started on vancomycin and zosyn. ID consulted.    Physical Exam:  /80 (BP Location: Right arm)   Pulse 92   Temp 97.8 °F (36.6 °C) (Oral)   Resp 16   Ht 5' 10\" (1.778 m)   Wt 277 lb 1.6 oz (125.7 kg)   SpO2 97%   BMI 39.76 kg/m²     Gen:   Awake, in bed  HEENT:  EOMI, neck supple  CV/lungs:  RRR, CTAB  Abdom:  Soft, NT/ND, +BS  Skin/extrem:  BLE wrapped, pictures from  reviewed  Lines:  PIV+    Laboratory Data: Reviewed    Microbiology: Reviewed    Radiology: Reviewed      [facility]  [phone]  2025

## 2025-04-04 NOTE — PROGRESS NOTES
[facility] Pharmacy Dosing Service      Initial Pharmacokinetic Consult for Vancomycin Dosing     Tesfaye Cobos is a 74 year old male who is being initiated on vancomycin therapy for cellulitis.  Pharmacy has been asked to dose vancomycin by Bia Atkinson.  The initial treatment and monitoring approach will be steady state AUC strategy.        Weight and Temperature:    Wt Readings from Last 1 Encounters:   25 125.7 kg (277 lb 1.6 oz)        Temp Readings from Last 1 Encounters:   25 98 °F (36.7 °C) (Oral)      Labs:   Recent Labs   Lab 25  0653   CREATSERUM 1.19      Estimated Creatinine Clearance: 57.9 mL/min (based on SCr of 1.19 mg/dL).     Recent Labs   Lab 25  0653   WBC 73.3*          The Pharmacokinetic Target is:     to 600 mg-h/L and trough <=15 mg/L    Renal Dosing Considerations:    None     Assessment/Plan:   Initial/Loading dose: Has received 2000 mg IV (14 mg/kg, capped at 2250 mg) x 1 loading dose.      Maintenance dose: Pharmacy will dose vancomycin at 1250 mg IV every 24 hours    Monitorin) Plan for vancomycin peak and trough to be obtained in approximately 72 hours    2) Pharmacy will order SCr as clinically indicated to assess renal function.    3) Pharmacy will monitor for toxicity and efficacy, adjust vancomycin dose and/or frequency, and order vancomycin levels as appropriate per the Pharmacy and Therapeutics Committee approved protocol until discontinuation of the medication.       We appreciate the opportunity to assist in the care of this patient.     Evon Lin, PharmD  2025  6:42 AM  [facility]  Pharmacy Extension: [phone]

## 2025-04-04 NOTE — WOUND PROGRESS NOTE
Wound consult received for pt's bilateral lower leg/ankle ulcers-photos reviewed. Pt's rn present during discussion. Pt was sitting up in the chair, feet on the floor. I advised pt who I was and that I was asked to assess his legs. I noted that there was strike through drainage through the dressings which were previously applied. I listened to the pt state that he wanted to be taken to [facility] for his care \"but wound up here\". Pt advised he wanted Vaseline, saline and Mupirocin applied to his ulcers. I explained that his wounds were draining and that type of dressing would add too much moisture at this time. I recommended and showed him silver alginate, ABD pads, non bordered aquacel foam dressing, kerlix, xeroform, and discussed silvadene-all appropriate dressings based on the drainage and red skin and the previous photos I saw. Pt refused everything I offered and told me \"go see someone else, come back tomorrow\". I explained that there is not a wound RN here tomorrow and advised I would let the MD know that he refused my offer of wound care. Bedside RN aware, Dr. Hough secure chat sent.

## 2025-04-04 NOTE — PROGRESS NOTES
Pt is adamantly refusing to have dressings to BLE changed and traced. States \"you don't have the right supplies I don't want you to make it worse\". Explained rationale several times that we need to trace and examine his wounds in order to get a baseline. Showed pt that we have appropriate supplies to care for his wounds. Pt still refusing at this time. Wound on consult and will evaluate pt in the AM. Dr. Hough aware.

## 2025-04-04 NOTE — PHYSICAL THERAPY NOTE
PT orders received and chart reviewed. RN approved activity. Patient sitting in bedside chair, declining participation in therapeutic evaluation at this time due to increased BLE pain. Will re-schedule evaluation for tomorrow.     Jenna Haase, PT, DPT  [facility]  Ext: [phone]

## 2025-04-04 NOTE — OCCUPATIONAL THERAPY NOTE
Attempted OT evaluation. Pt in chair getting up with staff PRN with walker. Refused OT evaluation due to pain; states he only will move when he has to. Will re-attempt as able.        Amrita Thompson OT  [facility]  Inpatient Rehabilitation  Occupational Therapy  [phone]

## 2025-04-04 NOTE — PROGRESS NOTES
[facility]  part of [facility]     Hospitalist Progress Note     Tesfaye Cobos Patient Status:  Inpatient    1950 MRN [id number]   Location [facility] 1W Attending Willam Hough MD   Hosp Day # 1 PCP None Pcp     Chief Complaint:   Chief Complaint   Patient presents with    Rash Skin Problem     INFECTED LEG ULCER        Subjective:     Patient seen sitting in chair.  No family at bedside.  Patient denies no acute events overnight.  Patient reports having some increased pain in legs today.  Believes erythema to be similar to previous.  Patient expressed concerns about wound care dressing recommendations.  States this is not how they are previously done.  Declined treatment by wound care nurses and    Objective:      Vital signs:  Vitals:    25 1855 25 2047 25 0551 25 0841   BP: (!) 174/94 159/80 149/80 159/80   BP Location: Right arm Right arm Right arm Right arm   Pulse: 92 104 83 92   Resp:  18 18 16   Temp: 97.5 °F (36.4 °C) 97.5 °F (36.4 °C) 98 °F (36.7 °C) 97.8 °F (36.6 °C)   TempSrc: Oral Oral Oral Oral   SpO2:  98% 97% 97%   Weight:       Height:           Intake/Output Summary (Last 24 hours) at 2025 1153  Last data filed at 2025 0110  Gross per 24 hour   Intake 1600 ml   Output --   Net 1600 ml           Physical Exam:    GENERAL:  Awake and alert, in no acute distress.  HEART:  Regular rhythm.  Regular rate  LUNGS:  Air entry was minimally decreased.  No increased work of breathing or wheezes   ABDOMEN: Soft and non-tender.    EXTREMITIES: Bilateral lower extremity edema.  Scaling of left foot.  Erythema and warmth of right lower extremity.  Scattered areas of weeping.  No signs of purulent drainage.  PSYCHIATRIC: Normal mood    Diagnostic Data:    Labs:    Recent Labs   Lab 25  0653 25  0752   WBC 73.3* 66.2*   HGB 13.2 13.0   MCV 87.9 88.4   .0 300.0   INR 1.02  --        Recent Labs   Lab 25  0653  04/04/25  0752   GLU 94 113*   BUN 17 19   CREATSERUM 1.19 1.25   CA 9.5 9.4   ALB  --  4.6    138   K 4.6 5.0    103   CO2 28.0 27.0   ALKPHO  --  109   AST  --  18   ALT  --  23   BILT  --  0.7   TP  --  6.9           Estimated Creatinine Clearance: 55.2 mL/min (based on SCr of 1.25 mg/dL).    Recent Labs   Lab 04/03/25  0653   PTP 14.0   INR 1.02            COVID-19  No results found for: \"COVID19\"    Pro-Calcitonin  No results for input(s): \"PCT\" in the last 168 hours.    Cardiac  Recent Labs   Lab 04/03/25  0653   PBNP 530*       Inflammatory Markers  No results for input(s): \"CRP\", \"MODESTA\", \"LDH\", \"DDIMER\" in the last 168 hours.    Culture:  No results found for this visit on 04/03/25.    XR TIBIA + FIBULA (2 VIEWS), LEFT (CPT=73590)    Result Date: 4/3/2025  CONCLUSION: Degenerative changes and soft tissue swelling without acute fracture or dislocation.     Dictated by (CST): Dago Ledezma MD on 4/03/2025 at 10:54 AM     Finalized by (CST): Dago Ledezma MD on 4/03/2025 at 10:54 AM          XR TIBIA + FIBULA (2 VIEWS), RIGHT (CPT=73590)    Result Date: 4/3/2025  CONCLUSION:   Degenerative changes and soft tissue swelling without acute fracture or dislocation.      Dictated by (CST): Dago Ledezma MD on 4/03/2025 at 10:52 AM     Finalized by (CST): Dago Ledezma MD on 4/03/2025 at 10:53 AM                 Medications:    vancomycin  10 mg/kg Intravenous Q24H    diclofenac  1 patch Topical 2 times per day    silver sulfADIAZINE   Topical Daily    gabapentin  300 mg Oral TID    losartan  100 mg Oral Daily    piperacillin-tazobactam  3.375 g Intravenous Q8H    heparin  5,000 Units Subcutaneous Q8H MARCOS    pantoprazole  40 mg Oral QPM       Assessment & Plan:       Cellulitis, unspecified cellulitis site  -Patient with ongoing and progressive lower extremity swelling, erythema and warmth.  -Patient states he was recently hospitalized and treated for left lower extremity cellulitis.  -Patient noted development  of worsening redness and swelling of right lower extremity  -X-rays reviewed.  Noted soft tissue swelling.  - Continuing broad-spectrum antibiotics   -Follow-up blood cultures  -ID consulted, appreciate further recommendations  -Local wound care, of note, patient refusing wound care nurses recommendations.  Continue dressings as patient will allow.     Reported history of restrictive lung disease  -No current signs of acute exacerbation  -Patient describes some mild dyspnea with exertion  -Continue albuterol inhaler as needed  -Continue Flonase  -Continue to monitor     HTN  - Mild elevations noted  -Possible related to pain  - adjusting pain regimen as above  - Continue ARB  - Monitor and adjust as needed        History of CLL  -Reports he follows with hematology/oncology as outpatient  -WBC of 66      Plan of care discussed with patient  at bedside . Discussed management/test result(s) with Rn and ID consultant    Quality:  DVT Prophylaxis: Heparin  CODE status: Full  Estimated date of discharge: TBD  Discharge is dependent on: clinical stability    80 minutes spent discussing with other providers, examining patient, obtaining history, reviewing medical records, interpreting and communicating test results/imaging, ordering tests/medications, discussing plan of care and documenting information.      Willam Hough MD          This note was prepared using Dragon Medical voice recognition dictation software. As a result errors may occur. When identified these errors have been corrected. While every attempt is made to correct errors during dictation discrepancies may still exist

## 2025-04-04 NOTE — PROGRESS NOTES
Pt finally agreeable to have dressings changed. Refused to have RN measure as \"you can't do that without it hurting\". Explained to pt I would be extremely gentle and not even touch the wound bed. Pt still refusing measurements. Did allow for picture to be taken. Pictures linked in flowsheets. Pt very upset that \"you aren't using vaseline like I always do\". Explained to pt that our protocol is to perform wet to dry dressings on open wounds pending additional orders from wound care RN or MD. Pt agreeable for wound consult for additional recommendations in AM.

## 2025-04-04 NOTE — PLAN OF CARE
Pt is A&Ox 4 - anxious and forgetful at times, stated having a \"foggy brain\", room air, tolerating regular diet, voiding freely, ambulating standby assist with a walker. Pt wound care completed. Pt refusing PO [place]/Oxy stating \"those dont work for me\", MD made aware and ordered PO Motrin and Acetaminophen. Call light within reach, calls appropriately, I-bed awareness/alarm on.       Problem: Patient Centered Care  Goal: Patient preferences are identified and integrated in the patient's plan of care  Description: Interventions:- What would you like us to know as we care for you? I usually get my care at the [facility]    - Provide timely, complete, and accurate information to patient/family  - Incorporate patient and family knowledge, values, beliefs, and cultural backgrounds into the planning and delivery of care  - Encourage patient/family to participate in care and decision-making at the level they choose  - Honor patient and family perspectives and choices  Outcome: Progressing     Problem: Patient/Family Goals  Goal: Patient/Family Long Term Goal  Description: Patient's Long Term Goal: Discharge from hospital    Interventions:  - Inpatient wound consult  - Transition to appropriate discharge regimen for antibiotic therapy  - Improve redness to bilateral legs  - See additional Care Plan goals for specific interventions  Outcome: Progressing  Goal: Patient/Family Short Term Goal  Description: Patient's Short Term Goal: Manage cellulitis to legs    Interventions:   - Infectious disease consultation as indicated  - Maintain IV antibiotic therapy as ordered  - Continue dressing changes to legs per routine  - See additional Care Plan goals for specific interventions  Outcome: Progressing     Problem: PAIN - ADULT  Goal: Verbalizes/displays adequate comfort level or patient's stated pain goal  Description: INTERVENTIONS:- Encourage pt to monitor pain and request assistance- Assess pain using appropriate pain scale-  Administer analgesics based on type and severity of pain and evaluate response- Implement non-pharmacological measures as appropriate and evaluate response- Consider cultural and social influences on pain and pain management- Manage/alleviate anxiety- Utilize distraction and/or relaxation techniques- Monitor for opioid side effects- Notify MD/LIP if interventions unsuccessful or patient reports new pain- Anticipate increased pain with activity and pre-medicate as appropriate  Outcome: Progressing     Problem: RISK FOR INFECTION - ADULT  Goal: Absence of fever/infection during anticipated neutropenic period  Description: INTERVENTIONS- Monitor WBC- Administer growth factors as ordered- Implement neutropenic guidelines  Outcome: Progressing     Problem: SAFETY ADULT - FALL  Goal: Free from fall injury  Description: INTERVENTIONS:- Assess pt frequently for physical needs- Identify cognitive and physical deficits and behaviors that affect risk of falls.- [place] fall precautions as indicated by assessment.- Educate pt/family on patient safety including physical limitations- Instruct pt to call for assistance with activity based on assessment- Modify environment to reduce risk of injury- Provide assistive devices as appropriate- Consider OT/PT consult to assist with strengthening/mobility- Encourage toileting schedule  Outcome: Progressing     Problem: DISCHARGE PLANNING  Goal: Discharge to home or other facility with appropriate resources  Description: INTERVENTIONS:- Identify barriers to discharge w/pt and caregiver- Include patient/family/discharge partner in discharge planning- Arrange for needed discharge resources and transportation as appropriate- Identify discharge learning needs (meds, wound care, etc)- Arrange for interpreters to assist at discharge as needed- Consider post-discharge preferences of patient/family/discharge partner- Complete POLST form as appropriate- Assess patient's ability to be responsible  for managing their own health- Refer to Case Management Department for coordinating discharge planning if the patient needs post-hospital services based on physician/LIP order or complex needs related to functional status, cognitive ability or social support system  Outcome: Progressing     Problem: SKIN/TISSUE INTEGRITY - ADULT  Goal: Skin integrity remains intact  Description: INTERVENTIONS- Assess and document risk factors for pressure ulcer development- Assess and document skin integrity- Monitor for areas of redness and/or skin breakdown- Initiate interventions, skin care algorithm/standards of care as needed  Outcome: Progressing  Goal: Incision(s), wounds(s) or drain site(s) healing without S/S of infection  Description: INTERVENTIONS:- Assess and document risk factors for pressure ulcer development- Assess and document skin integrity- Assess and document dressing/incision, wound bed, drain sites and surrounding tissue- Implement wound care per orders- Initiate isolation precautions as appropriate- Initiate Pressure Ulcer prevention bundle as indicated  Outcome: Progressing

## 2025-04-04 NOTE — CM/SW NOTE
04/04/25 1000   CM/SW Referral Data   Referral Source Social Work (self-referral)   Reason for Referral Discharge planning   Informant Patient   Medical Hx   Does patient have an established PCP? Yes   Significant Past Medical/Mental Health Hx Leukemia   Patient Info   Patient's Current Mental Status at Time of Assessment Alert;Oriented   Patient's Home Environment Condo/Apt no elevator  (2nd floor)   Number of Levels in Home 1   Number of Stair in Home 12  (to get to 2nd floor)   Patient lives with Alone   Patient Status Prior to Admission   Independent with ADLs and Mobility No   Pt. requires assistance with Bathing;Ambulating;Driving   Services in place prior to admission DME/Supplies at home   Type of DME/Supplies Quad Cane;Rollator Walker   Discharge Needs   Anticipated D/C needs To be determined     Sw self referral for DC Planning. Per chart review ID is following and Pt is currently on IV ABX. Sw met with Pt at bedside to discuss possible DC needs and options for IV ABX at DC. Above assessment complete.     Pt confirmed Address on file and could not remember PCP. Sw educated Pt on IV ABX options on DC. Pt is not agreeable to any option. Pt stated struggling with wounds for a while.     Per Bia Atkinson PA-C, Pt will likely DC on PO ABX due to Pt's refusal.     Plan: pending ABX plan, med clearance.     SW to remain available for support and/or discharge planning.    Rody Traylor MSW, LSW   [id number]

## 2025-04-04 NOTE — PLAN OF CARE
Pt's vital signs stable. PRN Oxy IR and morphine provided for pain. Alert and oriented x4. Can be forgetful and irritable. Baseline neuropathy to BLE. On room air. Tolerating diet. Voids freely. Up with standby assist and walker. Refusing heparin for DVT prophylaxis. Appropriate safety precautions maintained. Bed locked in lowest position, call light within reach, and frequent rounding maintained. Plan for wound consult.    Problem: Patient Centered Care  Goal: Patient preferences are identified and integrated in the patient's plan of care  Description: Interventions:- What would you like us to know as we care for you? I usually get my care at the [facility]    - Provide timely, complete, and accurate information to patient/family  - Incorporate patient and family knowledge, values, beliefs, and cultural backgrounds into the planning and delivery of care  - Encourage patient/family to participate in care and decision-making at the level they choose  - Honor patient and family perspectives and choices  Outcome: Progressing     Problem: PAIN - ADULT  Goal: Verbalizes/displays adequate comfort level or patient's stated pain goal  Description: INTERVENTIONS:- Encourage pt to monitor pain and request assistance- Assess pain using appropriate pain scale- Administer analgesics based on type and severity of pain and evaluate response- Implement non-pharmacological measures as appropriate and evaluate response- Consider cultural and social influences on pain and pain management- Manage/alleviate anxiety- Utilize distraction and/or relaxation techniques- Monitor for opioid side effects- Notify MD/LIP if interventions unsuccessful or patient reports new pain- Anticipate increased pain with activity and pre-medicate as appropriate  Outcome: Progressing     Problem: RISK FOR INFECTION - ADULT  Goal: Absence of fever/infection during anticipated neutropenic period  Description: INTERVENTIONS- Monitor WBC- Administer growth factors  as ordered- Implement neutropenic guidelines  Outcome: Progressing     Problem: SAFETY ADULT - FALL  Goal: Free from fall injury  Description: INTERVENTIONS:- Assess pt frequently for physical needs- Identify cognitive and physical deficits and behaviors that affect risk of falls.- [place] fall precautions as indicated by assessment.- Educate pt/family on patient safety including physical limitations- Instruct pt to call for assistance with activity based on assessment- Modify environment to reduce risk of injury- Provide assistive devices as appropriate- Consider OT/PT consult to assist with strengthening/mobility- Encourage toileting schedule  Outcome: Progressing     Problem: DISCHARGE PLANNING  Goal: Discharge to home or other facility with appropriate resources  Description: INTERVENTIONS:- Identify barriers to discharge w/pt and caregiver- Include patient/family/discharge partner in discharge planning- Arrange for needed discharge resources and transportation as appropriate- Identify discharge learning needs (meds, wound care, etc)- Arrange for interpreters to assist at discharge as needed- Consider post-discharge preferences of patient/family/discharge partner- Complete POLST form as appropriate- Assess patient's ability to be responsible for managing their own health- Refer to Case Management Department for coordinating discharge planning if the patient needs post-hospital services based on physician/LIP order or complex needs related to functional status, cognitive ability or social support system  Outcome: Progressing     Problem: SKIN/TISSUE INTEGRITY - ADULT  Goal: Skin integrity remains intact  Description: INTERVENTIONS- Assess and document risk factors for pressure ulcer development- Assess and document skin integrity- Monitor for areas of redness and/or skin breakdown- Initiate interventions, skin care algorithm/standards of care as needed  Outcome: Progressing  Goal: Incision(s), wounds(s) or  drain site(s) healing without S/S of infection  Description: INTERVENTIONS:- Assess and document risk factors for pressure ulcer development- Assess and document skin integrity- Assess and document dressing/incision, wound bed, drain sites and surrounding tissue- Implement wound care per orders- Initiate isolation precautions as appropriate- Initiate Pressure Ulcer prevention bundle as indicated  Outcome: Progressing

## 2025-04-05 LAB
ANION GAP SERPL CALC-SCNC: 9 MMOL/L (ref 0–18)
BASOPHILS # BLD AUTO: 0.08 X10(3) UL (ref 0–0.2)
BASOPHILS NFR BLD AUTO: 0.1 %
BUN BLD-MCNC: 19 MG/DL (ref 9–23)
BUN/CREAT SERPL: 14.3 (ref 10–20)
CALCIUM BLD-MCNC: 9.1 MG/DL (ref 8.7–10.4)
CHLORIDE SERPL-SCNC: 102 MMOL/L (ref 98–112)
CO2 SERPL-SCNC: 25 MMOL/L (ref 21–32)
CREAT BLD-MCNC: 1.33 MG/DL
DEPRECATED RDW RBC AUTO: 55 FL (ref 35.1–46.3)
EGFRCR SERPLBLD CKD-EPI 2021: 57 ML/MIN/1.73M2 (ref 60–?)
EOSINOPHIL # BLD AUTO: 0.36 X10(3) UL (ref 0–0.7)
EOSINOPHIL NFR BLD AUTO: 0.6 %
ERYTHROCYTE [DISTWIDTH] IN BLOOD BY AUTOMATED COUNT: 16.6 % (ref 11–15)
GLUCOSE BLD-MCNC: 110 MG/DL (ref 70–99)
HCT VFR BLD AUTO: 41 %
HGB BLD-MCNC: 12.6 G/DL
IMM GRANULOCYTES # BLD AUTO: 0.22 X10(3) UL (ref 0–1)
IMM GRANULOCYTES NFR BLD: 0.3 %
LYMPHOCYTES # BLD AUTO: 53.85 X10(3) UL (ref 1–4)
LYMPHOCYTES NFR BLD AUTO: 84.5 %
MCH RBC QN AUTO: 27.9 PG (ref 26–34)
MCHC RBC AUTO-ENTMCNC: 30.7 G/DL (ref 31–37)
MCV RBC AUTO: 90.9 FL
MONOCYTES # BLD AUTO: 2.04 X10(3) UL (ref 0.1–1)
MONOCYTES NFR BLD AUTO: 3.2 %
NEUTROPHILS # BLD AUTO: 7.16 X10 (3) UL (ref 1.5–7.7)
NEUTROPHILS # BLD AUTO: 7.16 X10(3) UL (ref 1.5–7.7)
NEUTROPHILS NFR BLD AUTO: 11.3 %
OSMOLALITY SERPL CALC.SUM OF ELEC: 285 MOSM/KG (ref 275–295)
PLATELET # BLD AUTO: 292 10(3)UL (ref 150–450)
POTASSIUM SERPL-SCNC: 5.4 MMOL/L (ref 3.5–5.1)
RBC # BLD AUTO: 4.51 X10(6)UL
SODIUM SERPL-SCNC: 136 MMOL/L (ref 136–145)
WBC # BLD AUTO: 63.7 X10(3) UL (ref 4–11)

## 2025-04-05 PROCEDURE — 99233 SBSQ HOSP IP/OBS HIGH 50: CPT | Performed by: INTERNAL MEDICINE

## 2025-04-05 RX ORDER — CARVEDILOL 6.25 MG/1
6.25 TABLET ORAL 2 TIMES DAILY WITH MEALS
Status: DISCONTINUED | OUTPATIENT
Start: 2025-04-05 | End: 2025-04-08

## 2025-04-05 NOTE — PLAN OF CARE
Tesfaye is alert/oriented. Vitals stable. Dressing to BLE changed. Tolerating diet. Ambulating with walker and SBA. Zosyn and Vanco for antibiotic coverage. Heparin for DVT prophylaxis. Voiding adequately. Tentative plan to discharge tomorrow. Bed low, locked, all safety measures in place.    Problem: Patient Centered Care  Goal: Patient preferences are identified and integrated in the patient's plan of care  Description: Interventions:- What would you like us to know as we care for you? I usually get my care at the [facility]    - Provide timely, complete, and accurate information to patient/family  - Incorporate patient and family knowledge, values, beliefs, and cultural backgrounds into the planning and delivery of care  - Encourage patient/family to participate in care and decision-making at the level they choose  - Honor patient and family perspectives and choices  Outcome: Progressing     Problem: Patient/Family Goals  Goal: Patient/Family Long Term Goal  Description: Patient's Long Term Goal: Discharge from hospital    Interventions:  - Inpatient wound consult  - Transition to appropriate discharge regimen for antibiotic therapy  - Improve redness to bilateral legs  - See additional Care Plan goals for specific interventions  Outcome: Progressing  Goal: Patient/Family Short Term Goal  Description: Patient's Short Term Goal: Manage cellulitis to legs    Interventions:   - Infectious disease consultation as indicated  - Maintain IV antibiotic therapy as ordered  - Continue dressing changes to legs per routine  - See additional Care Plan goals for specific interventions  Outcome: Progressing     Problem: PAIN - ADULT  Goal: Verbalizes/displays adequate comfort level or patient's stated pain goal  Description: INTERVENTIONS:- Encourage pt to monitor pain and request assistance- Assess pain using appropriate pain scale- Administer analgesics based on type and severity of pain and evaluate response- Implement  non-pharmacological measures as appropriate and evaluate response- Consider cultural and social influences on pain and pain management- Manage/alleviate anxiety- Utilize distraction and/or relaxation techniques- Monitor for opioid side effects- Notify MD/LIP if interventions unsuccessful or patient reports new pain- Anticipate increased pain with activity and pre-medicate as appropriate  Outcome: Progressing     Problem: RISK FOR INFECTION - ADULT  Goal: Absence of fever/infection during anticipated neutropenic period  Description: INTERVENTIONS- Monitor WBC- Administer growth factors as ordered- Implement neutropenic guidelines  Outcome: Progressing     Problem: SAFETY ADULT - FALL  Goal: Free from fall injury  Description: INTERVENTIONS:- Assess pt frequently for physical needs- Identify cognitive and physical deficits and behaviors that affect risk of falls.- [place] fall precautions as indicated by assessment.- Educate pt/family on patient safety including physical limitations- Instruct pt to call for assistance with activity based on assessment- Modify environment to reduce risk of injury- Provide assistive devices as appropriate- Consider OT/PT consult to assist with strengthening/mobility- Encourage toileting schedule  Outcome: Progressing     Problem: DISCHARGE PLANNING  Goal: Discharge to home or other facility with appropriate resources  Description: INTERVENTIONS:- Identify barriers to discharge w/pt and caregiver- Include patient/family/discharge partner in discharge planning- Arrange for needed discharge resources and transportation as appropriate- Identify discharge learning needs (meds, wound care, etc)- Arrange for interpreters to assist at discharge as needed- Consider post-discharge preferences of patient/family/discharge partner- Complete POLST form as appropriate- Assess patient's ability to be responsible for managing their own health- Refer to Case Management Department for coordinating  discharge planning if the patient needs post-hospital services based on physician/LIP order or complex needs related to functional status, cognitive ability or social support system  Outcome: Progressing     Problem: SKIN/TISSUE INTEGRITY - ADULT  Goal: Skin integrity remains intact  Description: INTERVENTIONS- Assess and document risk factors for pressure ulcer development- Assess and document skin integrity- Monitor for areas of redness and/or skin breakdown- Initiate interventions, skin care algorithm/standards of care as needed  Outcome: Progressing  Goal: Incision(s), wounds(s) or drain site(s) healing without S/S of infection  Description: INTERVENTIONS:- Assess and document risk factors for pressure ulcer development- Assess and document skin integrity- Assess and document dressing/incision, wound bed, drain sites and surrounding tissue- Implement wound care per orders- Initiate isolation precautions as appropriate- Initiate Pressure Ulcer prevention bundle as indicated  Outcome: Progressing

## 2025-04-05 NOTE — PHYSICAL THERAPY NOTE
PHYSICAL THERAPY EVALUATION - INPATIENT     Room Number: 422/422-A  Evaluation Date: 4/5/2025  Type of Evaluation: Initial   Physician Order: PT Eval and Treat    Presenting Problem: B LE edema and Cellulitis / Tib /fib area ulcers and wounds ---pt reports homebound and has not left his home in months --does not wear shoes or have shoes that he can wear  Co-Morbidities : restricted  lung disease , HTN and CLL  Reason for Therapy: Mobility Dysfunction and Discharge Planning    PHYSICAL THERAPY ASSESSMENT   Patient is a 74 year old male admitted 4/3/2025 for Presenting Problem: B LE edema and Cellulitis / Tib /fib area ulcers and wounds ---pt reports homebound and has not left his home in months --does not wear shoes or have shoes that he can wear.  Prior to admission, patient's baseline is Indep with difficulty expressed ADL and household ambulation.  Pt denies hx of falls.   Use of Rollator when in community.     Patient is currently functioning below baseline with bed mobility, transfers, gait, stair negotiation, standing prolonged periods, and performing household tasks.  Patient is requiring contact guard assist and minimal assist as a result of the following impairments: decreased functional strength, decreased endurance/aerobic capacity, pain, impaired standing balance, decreased muscular endurance, medical status, and wounds on B LE currently wrapped in kerlix dressings .  Physical Therapy will continue to follow for duration of hospitalization.    Patient will benefit from continued skilled PT Services at discharge to promote prior level of function and safety with additional support and return home with home health PT vs need for a rehab facility.  Pt declining IV ABX  and will have wound care needs at [place] . Pt lives alone in apartment .  Pt declined any need for rehab or home health services. Pt reports he would decline a RW for home use.     Pt reports his [place] Plan is \"to take a taxi to [place] and stay there  for a few days \".   Discussed pt reports with SW .  SW and team working on optimal DC Plan pt is agreeable to.      PLAN DURING HOSPITALIZATION  Nursing Mobility Recommendation : 1 Assist  PT Device Recommendation: Rolling walker, Gait belt (has Rollator outside --does not use an AD    \"I do not get dressed --I sit almost naked in the house \"   \"I do not go outside \")  PT Treatment Plan: Bed mobility, Body mechanics, Endurance, Energy conservation, Patient education, Family education, Gait training, Stair training, Transfer training, Balance training  Rehab Potential : Fair  Frequency (Obs): 5x/week     PHYSICAL THERAPY MEDICAL/SOCIAL HISTORY   History related to current admission:    From primary care MD note :   Cellulitis, unspecified cellulitis site  -Patient with ongoing and progressive lower extremity swelling, erythema and warmth.  -Patient states he was recently hospitalized and treated for left lower extremity cellulitis.  -Patient noted development of worsening redness and swelling of right lower extremity  -X-rays reviewed.  Noted soft tissue swelling.  - Continuing broad-spectrum antibiotics   -Follow-up blood cultures  -ID consulted, appreciate further recommendations  -Local wound care, of note, patient refusing wound care nurses recommendations.  Continue dressings as patient will allow.     Reported history of restrictive lung disease  -No current signs of acute exacerbation  -Patient describes some mild dyspnea with exertion  -Continue albuterol inhaler as needed  -Continue Flonase  -Continue to monitor     HTN  - Mild elevations noted  -Possible related to pain  - adjusting pain regimen as above  - Continue ARB  - Monitor and adjust as needed        History of CLL  -Reports he follows with hematology/oncology as outpatient  -WBC of 66       Problem List  Principal Problem:    Cellulitis, unspecified cellulitis site  Active Problems:    Leg wound, left, sequela    Leg wound, right, sequela      HOME  SITUATION  Type of Home: Apartment  Home Layout: One level (14 steps to unit)  Stairs to Enter : 14 (front side 14   back side 4 landing then approx 8-10)   Railing: Yes              Lives With: Alone    Drives: No   Patient Regularly Uses: Rollator, Glasses (Rollator when out in the community)         SUBJECTIVE  \"I don't get dressed. I am pretty much naked at home all the time\"     Pt reports he does not want anyone coming into his house as \"to messy with all the boxes around \"   Pt reports he never wears shoes and does not have shoes any none fit him      Pt reports has not left his home in months, he orders groceries for delivery     PHYSICAL THERAPY EXAMINATION   OBJECTIVE  Precautions: Bed/chair alarm  Fall Risk: High fall risk    WEIGHT BEARING RESTRICTION       PAIN ASSESSMENT  Rating: Other (Comment)  Location: B LE not rated in wound area  Management Techniques: Activity promotion, Body mechanics, Breathing techniques, Relaxation, Repositioning    COGNITION  Overall Cognitive Status:  WFL - within functional limits  Awareness of Deficits:  decreased awareness of deficits    RANGE OF MOTION AND STRENGTH ASSESSMENT  Upper extremity ROM and strength are within functional limits   Lower extremity ROM is within functional limits   Lower extremity strength is within functional limits for ambulation, pt agreeable  short distance only using a RW     BALANCE  Static Sitting: Good  Dynamic Sitting: Fair +  Static Standing: Fair  Dynamic Standing: Fair -    ADDITIONAL TESTS         B LE edema , skin on foot visible with poor skin integrity , B LE from knee to forefoot wrapped in kerlix dressings                   ACTIVITY TOLERANCE      HR 72       148/88 post activity         AM-PAC '6-Clicks' INPATIENT SHORT FORM - BASIC MOBILITY  How much difficulty does the patient currently have...  Patient Difficulty: Turning over in bed (including adjusting bedclothes, sheets and blankets)?: A Little   Patient Difficulty:  Sitting down on and standing up from a chair with arms (e.g., wheelchair, bedside commode, etc.): A Little   Patient Difficulty: Moving from lying on back to sitting on the side of the bed?: A Little   How much help from another person does the patient currently need...   Help from Another: Moving to and from a bed to a chair (including a wheelchair)?: A Little   Help from Another: Need to walk in hospital room?: A Little   Help from Another: Climbing 3-5 steps with a railing?: A Lot     AM-PAC Score:  Raw Score: 17   Approx Degree of Impairment: 50.57%   Standardized Score (AM-PAC Scale): 42.13   CMS Modifier (G-Code): CK    FUNCTIONAL ABILITY STATUS  Functional Mobility/Gait Assessment  Gait Assistance: Contact guard assist  Distance (ft): Encouragement needed to participate in ambulation . Pt able to amb with RW 30 ft declining further distance at this time  Assistive Device: Rolling walker  Pattern: R Steppage, L Steppage, R Foot flat, L Foot flat (Declined to wear hospital socks --education and encouragement was provided)  Sit to Stand: contact guard assist --encouragement needed for participation     Skilled Therapy Provided: Co session with OT , fxn mobility training as above , Pt education attempted and communication with RN , primary care and SW . At end of session pt declining to elevate LE even with education provided .     Education Provided To: Patient   Patient Education: Role of Physical Therapy, Plan of Care, Discharge Recommendations, DME Recommendations, Functional Transfer Techniques, Fall Prevention, Edema Reduction, Gait Training   Patient's Response to Education: Verbalized Understanding, Returned Demonstration, Requires Further Education (Pt is reluctant to receive therapy education , politely declining recommendations made and present his own DC Plan for next level of care)      The patient's Approx Degree of Impairment: 50.57% has been calculated based on documentation in the [facility] '6  clicks' Inpatient Basic Mobility Short Form.  Research supports that patients with this level of impairment may benefit from home setting vs need for rehab facility .  Final disposition will be made by interdisciplinary medical team.    Patient End of Session: Up in chair, Needs met, Call light within reach, RN aware of session/findings    CURRENT GOALS  Goals to be met by: 4/25/25  Patient Goal Patient's self-stated goal is: plan for taking a taxi to [place] as next level of care once DC from     Goal #1 Patient is able to demonstrate supine - sit EOB @ level: modified independent     Goal #1   Current Status    Goal #2 Patient is able to demonstrate transfers EOB to/from Chair/Wheelchair at assistance level: modified independent with LRAD     Goal #2  Current Status    Goal #3 Patient is able to ambulate 150 feet with assist device: walker - rolling at assistance level: modified independent   Goal #3   Current Status    Goal #4 Patient will negotiate 14 stairs/one curb w/ assistive device and supervision   Goal #4   Current Status    Goal #5 Patient to demonstrate independence with home activity/exercise instructions provided to patient in preparation for discharge.   Goal #5   Current Status    Goal #6    Goal #6  Current Status      Patient Evaluation Complexity Level:  History Moderate - 1 or 2 personal factors and/or co-morbidities   Examination of body systems Low -  addressing 1-2 elements   Clinical Presentation  Moderate - Evolving   Clinical Decision Making  Moderate Complexity     PT eval and therapy activity 2 units

## 2025-04-05 NOTE — PROGRESS NOTES
[facility]  part of [facility]     Hospitalist Progress Note     Tesfaye Cobos Patient Status:  Inpatient    1950 MRN [id number]   Location [facility] 1W Attending Willam Hough MD   Hosp Day # 2 PCP None Pcp     Chief Complaint:   Chief Complaint   Patient presents with    Rash Skin Problem     INFECTED LEG ULCER        Subjective:     Patient seen sitting in chair.  No family at bedside.  Patient denies no acute events overnight.  Patient reports pain control much improved today.  Agreeable to further wound care today.  Without new complaints.    Objective:      Vital signs:  Vitals:    25 2136 25 2344 25 0553 25 1025   BP: 145/78 (!) 144/97 (!) 146/93 148/88   BP Location: Right arm Right arm Right arm Right arm   Pulse: 79 79 72 72   Resp: 16 16 17    Temp: 98 °F (36.7 °C) 97.8 °F (36.6 °C) 98 °F (36.7 °C)    TempSrc: Oral Oral Temporal    SpO2: 96% 94% 97%    Weight:       Height:         No intake or output data in the 24 hours ending 25 1042          Physical Exam:    GENERAL:  Awake and alert, in no acute distress.  HEART:  Regular rhythm.  Regular rate  LUNGS:  Air entry was minimally decreased.  No increased work of breathing or wheezes   ABDOMEN: Soft and non-tender.    EXTREMITIES: Bilateral lower extremity edema.  Dressings in place.  No strikethrough noted.  PSYCHIATRIC: Normal mood    Diagnostic Data:    Labs:    Recent Labs   Lab 25  0653 25  0523   WBC 73.3* 66.2* 63.7*   HGB 13.2 13.0 12.6*   MCV 87.9 88.4 90.9   .0 300.0 292.0   INR 1.02  --   --        Recent Labs   Lab 25  0653 25  0752 25  0523   GLU 94 113* 110*   BUN 17 19 19   CREATSERUM 1.19 1.25 1.33*   CA 9.5 9.4 9.1   ALB  --  4.6  --     138 136   K 4.6 5.0 5.4*    103 102   CO2 28.0 27.0 25.0   ALKPHO  --  109  --    AST  --  18  --    ALT  --  23  --    BILT  --  0.7  --    TP  --  6.9  --             Estimated Creatinine Clearance: 51.8 mL/min (A) (based on SCr of 1.33 mg/dL (H)).    Recent Labs   Lab 04/03/25  0653   PTP 14.0   INR 1.02            COVID-19  No results found for: \"COVID19\"    Pro-Calcitonin  No results for input(s): \"PCT\" in the last 168 hours.    Cardiac  Recent Labs   Lab 04/03/25  0653   PBNP 530*       Inflammatory Markers  No results for input(s): \"CRP\", \"MODESTA\", \"LDH\", \"DDIMER\" in the last 168 hours.    Culture:  Hospital Encounter on 04/03/25   1. Blood Culture     Status: None (Preliminary result)    Collection Time: 04/03/25  1:34 PM    Specimen: Blood,peripheral   Result Value Ref Range    Blood Culture Result No Growth 1 Day N/A       XR TIBIA + FIBULA (2 VIEWS), LEFT (CPT=73590)    Result Date: 4/3/2025  CONCLUSION: Degenerative changes and soft tissue swelling without acute fracture or dislocation.     Dictated by (CST): Dago Ledezma MD on 4/03/2025 at 10:54 AM     Finalized by (CST): Dago Ledezma MD on 4/03/2025 at 10:54 AM          XR TIBIA + FIBULA (2 VIEWS), RIGHT (CPT=73590)    Result Date: 4/3/2025  CONCLUSION:   Degenerative changes and soft tissue swelling without acute fracture or dislocation.      Dictated by (CST): Dago Ledezma MD on 4/03/2025 at 10:52 AM     Finalized by (CST): Dago Ledezma MD on 4/03/2025 at 10:53 AM                 Medications:    vancomycin  10 mg/kg Intravenous Q24H    silver sulfADIAZINE   Topical Daily    clotrimazole-betamethasone   Topical BID    ibuprofen  600 mg Oral TID    gabapentin  300 mg Oral TID    losartan  100 mg Oral Daily    piperacillin-tazobactam  3.375 g Intravenous Q8H    heparin  5,000 Units Subcutaneous Q8H MARCOS    pantoprazole  40 mg Oral QPM       Assessment & Plan:       Cellulitis, unspecified cellulitis site  -Patient initially presented with ongoing and progressive lower extremity swelling, erythema and warmth.  -Patient states he was recently hospitalized and treated for left lower extremity cellulitis.  -Patient  noted development of worsening redness and swelling of right lower extremity  -X-rays reviewed.  Noted soft tissue swelling.  - Continuing broad-spectrum antibiotics   -Blood cultures no growth to date  -ID consulted, appreciate further recommendations  - Continue local wound care as patient will allow.     Reported history of restrictive lung disease  -No current signs of acute exacerbation  -Patient describes some mild dyspnea with exertion  -Continue albuterol inhaler as needed  -Continue Flonase  -Continue to monitor    Acute Kidney Injury with hyperkalemia  - Patient reported decreased p.o. intake.  -Patient also recently started on NSAIDs, Vanco and Zosyn  -Holding further NSAIDs  -Encouraging p.o. intake  - Avoiding Nephrotoxic agents as able  - Cont to monitor       HTN  - elevations noted  -Adding Coreg  - Continue ARB  - Monitor and adjust as needed        History of CLL  -Reports he follows with hematology/oncology as outpatient  -WBC of 66    Plan of care discussed with patient  at bedside . Discussed management/test result(s) with Rn and ID consultant    Quality:  DVT Prophylaxis: Heparin  CODE status: Full  Estimated date of discharge: TBD  Discharge is dependent on: clinical stability      Willam Hough MD          This note was prepared using Dragon Medical voice recognition dictation software. As a result errors may occur. When identified these errors have been corrected. While every attempt is made to correct errors during dictation discrepancies may still exist

## 2025-04-05 NOTE — PLAN OF CARE
Patient to transfer to [facility]. Report given to Idalmis FLORES  Problem: Patient Centered Care  Goal: Patient preferences are identified and integrated in the patient's plan of care  Description: Interventions:- What would you like us to know as we care for you? I usually get my care at the [facility]    - Provide timely, complete, and accurate information to patient/family  - Incorporate patient and family knowledge, values, beliefs, and cultural backgrounds into the planning and delivery of care  - Encourage patient/family to participate in care and decision-making at the level they choose  - Honor patient and family perspectives and choices  Outcome: Progressing     Problem: PAIN - ADULT  Goal: Verbalizes/displays adequate comfort level or patient's stated pain goal  Description: INTERVENTIONS:- Encourage pt to monitor pain and request assistance- Assess pain using appropriate pain scale- Administer analgesics based on type and severity of pain and evaluate response- Implement non-pharmacological measures as appropriate and evaluate response- Consider cultural and social influences on pain and pain management- Manage/alleviate anxiety- Utilize distraction and/or relaxation techniques- Monitor for opioid side effects- Notify MD/LIP if interventions unsuccessful or patient reports new pain- Anticipate increased pain with activity and pre-medicate as appropriate  Outcome: Progressing     Problem: SAFETY ADULT - FALL  Goal: Free from fall injury  Description: INTERVENTIONS:- Assess pt frequently for physical needs- Identify cognitive and physical deficits and behaviors that affect risk of falls.- [place] fall precautions as indicated by assessment.- Educate pt/family on patient safety including physical limitations- Instruct pt to call for assistance with activity based on assessment- Modify environment to reduce risk of injury- Provide assistive devices as appropriate- Consider OT/PT consult to assist with  strengthening/mobility- Encourage toileting schedule  Outcome: Progressing     Problem: DISCHARGE PLANNING  Goal: Discharge to home or other facility with appropriate resources  Description: INTERVENTIONS:- Identify barriers to discharge w/pt and caregiver- Include patient/family/discharge partner in discharge planning- Arrange for needed discharge resources and transportation as appropriate- Identify discharge learning needs (meds, wound care, etc)- Arrange for interpreters to assist at discharge as needed- Consider post-discharge preferences of patient/family/discharge partner- Complete POLST form as appropriate- Assess patient's ability to be responsible for managing their own health- Refer to Case Management Department for coordinating discharge planning if the patient needs post-hospital services based on physician/LIP order or complex needs related to functional status, cognitive ability or social support system  Outcome: Progressing     Problem: SKIN/TISSUE INTEGRITY - ADULT  Goal: Incision(s), wounds(s) or drain site(s) healing without S/S of infection  Description: INTERVENTIONS:- Assess and document risk factors for pressure ulcer development- Assess and document skin integrity- Assess and document dressing/incision, wound bed, drain sites and surrounding tissue- Implement wound care per orders- Initiate isolation precautions as appropriate- Initiate Pressure Ulcer prevention bundle as indicated  Outcome: Not Progressing

## 2025-04-05 NOTE — OCCUPATIONAL THERAPY NOTE
OCCUPATIONAL THERAPY EVALUATION - INPATIENT     Room Number: 422/422-A  Evaluation Date: 4/5/2025  Type of Evaluation: Initial  Presenting Problem: cellulitis    Physician Order: IP Consult to Occupational Therapy  Reason for Therapy: ADL/IADL Dysfunction and Discharge Planning    OCCUPATIONAL THERAPY ASSESSMENT   Patient is a 74 year old male admitted 4/3/2025 for cellulitis.  Prior to admission, patient's baseline is MOD I with ADLS, IADLS, lives alone.  Patient is currently functioning below baseline with toileting, bathing, upper body dressing, lower body dressing, bed mobility, and transfers.  Patient is requiring contact guard assist and minimal assist as a result of the following impairments: decreased functional strength, decreased functional reach, decreased endurance, pain, impaired dynamic balance, impaired motor planning, decreased muscular endurance, and medical status. Occupational Therapy will continue to follow for duration of hospitalization.    Patient will benefit from continued skilled OT Services at discharge to promote prior level of function and safety with additional support and return home with home health OT.    PLAN DURING HOSPITALIZATION  OT Device Recommendations: TBD  OT Treatment Plan: Balance activities, Energy conservation/work simplification techniques, ADL training, IADL training, Functional transfer training, Patient/Family education, Patient/Family training     OCCUPATIONAL THERAPY MEDICAL/SOCIAL HISTORY   Problem List  Principal Problem:    Cellulitis, unspecified cellulitis site  Active Problems:    Leg wound, left, sequela    Leg wound, right, sequela    HOME SITUATION  Type of Home: Apartment  Home Layout: One level (14 steps to unit)  Lives With: Alone  Toilet and Equipment: Standard height toilet  Shower/Tub and Equipment: Tub-shower combo  Drives: No  Patient Regularly Uses: Rollator; Glasses (Rollator when out in the community)    Stairs in Home: 14 to unit  Use of  Assistive Device(s): rollator in community    Prior Level of [place]: MOD I with ADLs, IADLS.     SUBJECTIVE  \"I don't get dressed. I am pretty much naked at home all the time\"    OCCUPATIONAL THERAPY EXAMINATION      OBJECTIVE  Precautions: Bed/chair alarm  Fall Risk: High fall risk      PAIN ASSESSMENT  Ratin  Location: B legs on wounds  Management Techniques: Repositioning; Activity promotion; Body mechanics      ACTIVITY TOLERANCE  Pulse: 96        BP: (!) 163/93             O2 SATURATIONS  Oxygen Therapy  SPO2% on Room Air at Rest: 95    COGNITION  Overall Cognitive Status:  WFL - within functional limits    VISION  Current Vision: wears glasses all the time    PERCEPTION  Overall Perception Status:   WFL - within functional limits    SENSATION  Light touch:  impaired BLEs    Communication: WFL    Behavioral/Emotional/Social: WFL    RANGE OF MOTION   Upper extremity ROM is within functional limits    STRENGTH ASSESSMENT  Upper extremity strength is within functional limits     COORDINATION  Gross Motor: WFL   Fine Motor: WFL     ACTIVITIES OF DAILY LIVING ASSESSMENT  AM-PAC ‘6-Clicks’ Inpatient Daily Activity Short Form  How much help from another person does the patient currently need…  -   Putting on and taking off regular lower body clothing?: A Lot  -   Bathing (including washing, rinsing, drying)?: A Lot  -   Toileting, which includes using toilet, bedpan or urinal? : A Little  -   Putting on and taking off regular upper body clothing?: A Little  -   Taking care of personal grooming such as brushing teeth?: None  -   Eating meals?: None    AM-PAC Score:  Score: 18  Approx Degree of Impairment: 46.65%  Standardized Score (AM-PAC Scale): 38.66  CMS Modifier (G-Code): CK    FUNCTIONAL TRANSFER ASSESSMENT  Sit to Stand: Chair  Chair: Contact Guard Assist    BED MOBILITY     BALANCE ASSESSMENT  Static Sitting: Stand-by Assist  Static Standing: Contact Guard Assist    FUNCTIONAL ADL ASSESSMENT  Eating:  Independent  Grooming Seated: Stand-by Assist  Bathing Seated: Stand-by Assist  UB Dressing Seated: Stand-by Assist  LB Dressing Seated: Not Tested (refusing to don socks)  Toileting Seated: Stand-by Assist    THERAPEUTIC EXERCISE     Skilled Therapy Provided: Pt seen for skilled OT evaluation session to address pt's I and safety with functional mobility, t/fs, and ADLs. Pt's RN approved session and pt agreeable. Pt performing functional mobility and t/fs within the room with CGA with RW. Pt refusing to perform LBD or don socks. Pt edu provided re: role of OT, safe t/f techniques, DME/AE for ADLs, fall prevention, safe RW management. Pt demo'd understanding of all concepts covered. End of session, pt up in recliner with alarm on, all needs met. RN aware of pt status. Continue skilled OT.       EDUCATION PROVIDED  Patient Education : Role of Occupational Therapy; Plan of Care; Discharge Recommendations; DME Recommendations; Functional Transfer Techniques; Fall Prevention; Surgical Precautions; Posture/Positioning; Energy Conservation; Edema Reduction; Proper Body Mechanics  Patient's Response to Education: Verbalized Understanding; Returned Demonstration    The patient's Approx Degree of Impairment: 46.65% has been calculated based on documentation in the [facility] '6 clicks' Inpatient Daily Activity Short Form.  Research supports that patients with this level of impairment may benefit from HHOT.  Final disposition will be made by interdisciplinary medical team.     Patient End of Session: Up in chair, Call light within reach, Needs met, RN aware of session/findings, All patient questions and concerns addressed    OT Goals  Patients self stated goal is: go home     Patient will complete functional transfer with MOD I   Comment:     Patient will complete toileting with MOD I   Comment:     Patient will tolerate standing for 3 minutes in prep for adls with MOD I    Comment:    Patient will complete item retrieval with MOD  I   Comment:          Goals  on: 25  Frequency: 3-5x/week    Patient Evaluation Complexity Level:   Occupational Profile/Medical History MODERATE - Expanded review of history including review of medical or therapy record   Specific performance deficits impacting engagement in ADL/IADL MODERATE  3 - 5 performance deficits   Client Assessment/Performance Deficits MODERATE - Comorbidities and min to mod modifications of tasks    Clinical Decision Making MODERATE - Analysis of occupational profile, detailed assessments, several treatment options    Overall Complexity MODERATE     OT Session Time:   Self-Care Home Management: 10 minutes  Therapeutic Activity: 15 minutes    Mireya Carpenter, Occupational Therapist, MS, OTR/L

## 2025-04-05 NOTE — PLAN OF CARE
Pt alert and oriented x4 on room air. Forgetful, needs reminders. Up x1 with walker. Scheduled pain meds for pain. Dressings to BLE, refused assessment. IV abx given. Call light within reach.    Problem: Patient Centered Care  Goal: Patient preferences are identified and integrated in the patient's plan of care  Description: Interventions:- What would you like us to know as we care for you? I usually get my care at the [facility]    - Provide timely, complete, and accurate information to patient/family  - Incorporate patient and family knowledge, values, beliefs, and cultural backgrounds into the planning and delivery of care  - Encourage patient/family to participate in care and decision-making at the level they choose  - Honor patient and family perspectives and choices  Outcome: Progressing     Problem: Patient/Family Goals  Goal: Patient/Family Long Term Goal  Description: Patient's Long Term Goal: Discharge from hospital    Interventions:  - Inpatient wound consult  - Transition to appropriate discharge regimen for antibiotic therapy  - Improve redness to bilateral legs  - See additional Care Plan goals for specific interventions  Outcome: Progressing  Goal: Patient/Family Short Term Goal  Description: Patient's Short Term Goal: Manage cellulitis to legs    Interventions:   - Infectious disease consultation as indicated  - Maintain IV antibiotic therapy as ordered  - Continue dressing changes to legs per routine  - See additional Care Plan goals for specific interventions  Outcome: Progressing     Problem: PAIN - ADULT  Goal: Verbalizes/displays adequate comfort level or patient's stated pain goal  Description: INTERVENTIONS:- Encourage pt to monitor pain and request assistance- Assess pain using appropriate pain scale- Administer analgesics based on type and severity of pain and evaluate response- Implement non-pharmacological measures as appropriate and evaluate response- Consider cultural and social influences  on pain and pain management- Manage/alleviate anxiety- Utilize distraction and/or relaxation techniques- Monitor for opioid side effects- Notify MD/LIP if interventions unsuccessful or patient reports new pain- Anticipate increased pain with activity and pre-medicate as appropriate  Outcome: Progressing     Problem: RISK FOR INFECTION - ADULT  Goal: Absence of fever/infection during anticipated neutropenic period  Description: INTERVENTIONS- Monitor WBC- Administer growth factors as ordered- Implement neutropenic guidelines  Outcome: Progressing     Problem: SAFETY ADULT - FALL  Goal: Free from fall injury  Description: INTERVENTIONS:- Assess pt frequently for physical needs- Identify cognitive and physical deficits and behaviors that affect risk of falls.- [place] fall precautions as indicated by assessment.- Educate pt/family on patient safety including physical limitations- Instruct pt to call for assistance with activity based on assessment- Modify environment to reduce risk of injury- Provide assistive devices as appropriate- Consider OT/PT consult to assist with strengthening/mobility- Encourage toileting schedule  Outcome: Progressing     Problem: DISCHARGE PLANNING  Goal: Discharge to home or other facility with appropriate resources  Description: INTERVENTIONS:- Identify barriers to discharge w/pt and caregiver- Include patient/family/discharge partner in discharge planning- Arrange for needed discharge resources and transportation as appropriate- Identify discharge learning needs (meds, wound care, etc)- Arrange for interpreters to assist at discharge as needed- Consider post-discharge preferences of patient/family/discharge partner- Complete POLST form as appropriate- Assess patient's ability to be responsible for managing their own health- Refer to Case Management Department for coordinating discharge planning if the patient needs post-hospital services based on physician/LIP order or complex needs  related to functional status, cognitive ability or social support system  Outcome: Progressing     Problem: SKIN/TISSUE INTEGRITY - ADULT  Goal: Skin integrity remains intact  Description: INTERVENTIONS- Assess and document risk factors for pressure ulcer development- Assess and document skin integrity- Monitor for areas of redness and/or skin breakdown- Initiate interventions, skin care algorithm/standards of care as needed  Outcome: Progressing  Goal: Incision(s), wounds(s) or drain site(s) healing without S/S of infection  Description: INTERVENTIONS:- Assess and document risk factors for pressure ulcer development- Assess and document skin integrity- Assess and document dressing/incision, wound bed, drain sites and surrounding tissue- Implement wound care per orders- Initiate isolation precautions as appropriate- Initiate Pressure Ulcer prevention bundle as indicated  Outcome: Progressing

## 2025-04-06 LAB
ANION GAP SERPL CALC-SCNC: 8 MMOL/L (ref 0–18)
ATRIAL RATE: 72 BPM
BASOPHILS # BLD AUTO: 0.07 X10(3) UL (ref 0–0.2)
BASOPHILS NFR BLD AUTO: 0.1 %
BUN BLD-MCNC: 17 MG/DL (ref 9–23)
BUN/CREAT SERPL: 14.3 (ref 10–20)
CALCIUM BLD-MCNC: 9.1 MG/DL (ref 8.7–10.4)
CHLORIDE SERPL-SCNC: 103 MMOL/L (ref 98–112)
CO2 SERPL-SCNC: 24 MMOL/L (ref 21–32)
CREAT BLD-MCNC: 1.19 MG/DL
DEPRECATED RDW RBC AUTO: 54.3 FL (ref 35.1–46.3)
EGFRCR SERPLBLD CKD-EPI 2021: 65 ML/MIN/1.73M2 (ref 60–?)
EOSINOPHIL # BLD AUTO: 0.38 X10(3) UL (ref 0–0.7)
EOSINOPHIL NFR BLD AUTO: 0.6 %
ERYTHROCYTE [DISTWIDTH] IN BLOOD BY AUTOMATED COUNT: 16.2 % (ref 11–15)
GLUCOSE BLD-MCNC: 110 MG/DL (ref 70–99)
GLUCOSE BLDC GLUCOMTR-MCNC: 119 MG/DL (ref 70–99)
GLUCOSE BLDC GLUCOMTR-MCNC: 125 MG/DL (ref 70–99)
GLUCOSE BLDC GLUCOMTR-MCNC: 126 MG/DL (ref 70–99)
GLUCOSE BLDC GLUCOMTR-MCNC: 61 MG/DL (ref 70–99)
GLUCOSE BLDC GLUCOMTR-MCNC: 79 MG/DL (ref 70–99)
HCT VFR BLD AUTO: 39.7 %
HGB BLD-MCNC: 12.5 G/DL
IMM GRANULOCYTES # BLD AUTO: 0.21 X10(3) UL (ref 0–1)
IMM GRANULOCYTES NFR BLD: 0.3 %
LYMPHOCYTES # BLD AUTO: 54.56 X10(3) UL (ref 1–4)
LYMPHOCYTES NFR BLD AUTO: 85.1 %
MCH RBC QN AUTO: 28.7 PG (ref 26–34)
MCHC RBC AUTO-ENTMCNC: 31.5 G/DL (ref 31–37)
MCV RBC AUTO: 91.3 FL
MONOCYTES # BLD AUTO: 1.56 X10(3) UL (ref 0.1–1)
MONOCYTES NFR BLD AUTO: 2.4 %
NEUTROPHILS # BLD AUTO: 7.31 X10 (3) UL (ref 1.5–7.7)
NEUTROPHILS # BLD AUTO: 7.31 X10(3) UL (ref 1.5–7.7)
NEUTROPHILS NFR BLD AUTO: 11.5 %
OSMOLALITY SERPL CALC.SUM OF ELEC: 282 MOSM/KG (ref 275–295)
P AXIS: 60 DEGREES
P-R INTERVAL: 204 MS
PLATELET # BLD AUTO: 247 10(3)UL (ref 150–450)
POTASSIUM SERPL-SCNC: 6.5 MMOL/L (ref 3.5–5.1)
Q-T INTERVAL: 410 MS
QRS DURATION: 100 MS
QTC CALCULATION (BEZET): 448 MS
R AXIS: 21 DEGREES
RBC # BLD AUTO: 4.35 X10(6)UL
SODIUM SERPL-SCNC: 135 MMOL/L (ref 136–145)
T AXIS: 70 DEGREES
VENTRICULAR RATE: 72 BPM
WBC # BLD AUTO: 64.1 X10(3) UL (ref 4–11)

## 2025-04-06 PROCEDURE — 99233 SBSQ HOSP IP/OBS HIGH 50: CPT | Performed by: INTERNAL MEDICINE

## 2025-04-06 RX ORDER — CHOLECALCIFEROL (VITAMIN D3) 25 MCG
4000 TABLET ORAL EVERY EVENING
Status: DISCONTINUED | OUTPATIENT
Start: 2025-04-07 | End: 2025-04-08

## 2025-04-06 RX ORDER — CALCIUM GLUCONATE 10 MG/ML
1 INJECTION, SOLUTION INTRAVENOUS ONCE
Status: COMPLETED | OUTPATIENT
Start: 2025-04-06 | End: 2025-04-06

## 2025-04-06 RX ORDER — CLOTRIMAZOLE AND BETAMETHASONE DIPROPIONATE 10; .64 MG/G; MG/G
1 CREAM TOPICAL 2 TIMES DAILY
Qty: 45 G | Refills: 0 | Status: SHIPPED | OUTPATIENT
Start: 2025-04-06

## 2025-04-06 RX ORDER — DEXTROSE MONOHYDRATE 25 G/50ML
25 INJECTION, SOLUTION INTRAVENOUS
Status: DISPENSED | OUTPATIENT
Start: 2025-04-06 | End: 2025-04-06

## 2025-04-06 RX ORDER — DOXEPIN HYDROCHLORIDE 50 MG/1
1 CAPSULE ORAL EVERY EVENING
Status: DISCONTINUED | OUTPATIENT
Start: 2025-04-06 | End: 2025-04-08

## 2025-04-06 RX ORDER — DEXTROSE MONOHYDRATE 25 G/50ML
INJECTION, SOLUTION INTRAVENOUS ONCE
Status: COMPLETED | OUTPATIENT
Start: 2025-04-06 | End: 2025-04-06

## 2025-04-06 RX ORDER — SULFAMETHOXAZOLE AND TRIMETHOPRIM 800; 160 MG/1; MG/1
1 TABLET ORAL 2 TIMES DAILY
Qty: 14 TABLET | Refills: 0 | Status: SHIPPED | OUTPATIENT
Start: 2025-04-06 | End: 2025-04-13

## 2025-04-06 RX ORDER — INDOMETHACIN 25 MG/1
50 CAPSULE ORAL ONCE
Status: COMPLETED | OUTPATIENT
Start: 2025-04-06 | End: 2025-04-06

## 2025-04-06 NOTE — PROGRESS NOTES
[facility]  part of [facility]     Hospitalist Progress Note     Tesfaye Cobos Patient Status:  Inpatient    1950 MRN [id number]   Location [facility] 1W Attending Willam Hough MD   Hosp Day # 3 PCP None Pcp     Chief Complaint:   Chief Complaint   Patient presents with    Rash Skin Problem     INFECTED LEG ULCER        Subjective:     Patient seen sitting in chair.  No family at bedside.  Patient denies acute events overnight.  Patient reports pain control improving.  Expressed concerns about medication changes.      Objective:      Vital signs:  Vitals:    25 2227 25 0706 25 0758   BP: (!) 164/96 148/75 (!) 176/83 158/86   BP Location: Right arm Right arm Right arm Right arm   Pulse: 90 81 75 84   Resp: 20 20 20 18   Temp: 97.1 °F (36.2 °C)  97.4 °F (36.3 °C) 97.3 °F (36.3 °C)   TempSrc: Temporal  Oral Temporal   SpO2: 99% 97% 99% 99%   Weight:       Height:         No intake or output data in the 24 hours ending 25 1022          Physical Exam:    GENERAL:  Awake and alert, in no acute distress.  HEART:  Regular rhythm.  Regular rate  LUNGS:  Air entry was minimally decreased.  No increased work of breathing or wheezes   ABDOMEN: Soft and non-tender.    EXTREMITIES: Bilateral lower extremity edema.  Dressings in place.  Erythema decreased from demarcated lines  PSYCHIATRIC: Normal mood    Diagnostic Data:    Labs:    Recent Labs   Lab 25  0653 25  0752 2523 25  0508   WBC 73.3* 66.2* 63.7* 64.1*   HGB 13.2 13.0 12.6* 12.5*   MCV 87.9 88.4 90.9 91.3   .0 300.0 292.0 247.0   INR 1.02  --   --   --        Recent Labs   Lab 25  0752 25  0523 25  0508   * 110* 110*   BUN 19 19 17   CREATSERUM 1.25 1.33* 1.19   CA 9.4 9.1 9.1   ALB 4.6  --   --     136 135*   K 5.0 5.4* 6.5*    102 103   CO2 27.0 25.0 24.0   ALKPHO 109  --   --    AST 18  --   --    ALT 23  --   --     BILT 0.7  --   --    TP 6.9  --   --            Estimated Creatinine Clearance: 57.9 mL/min (based on SCr of 1.19 mg/dL).    Recent Labs   Lab 04/03/25  0653   PTP 14.0   INR 1.02            COVID-19  No results found for: \"COVID19\"    Pro-Calcitonin  No results for input(s): \"PCT\" in the last 168 hours.    Cardiac  Recent Labs   Lab 04/03/25  0653   PBNP 530*       Inflammatory Markers  No results for input(s): \"CRP\", \"MODESTA\", \"LDH\", \"DDIMER\" in the last 168 hours.    Culture:  Hospital Encounter on 04/03/25   1. Blood Culture     Status: None (Preliminary result)    Collection Time: 04/03/25  1:34 PM    Specimen: Blood,peripheral   Result Value Ref Range    Blood Culture Result No Growth 2 Days N/A       XR TIBIA + FIBULA (2 VIEWS), LEFT (CPT=73590)    Result Date: 4/3/2025  CONCLUSION: Degenerative changes and soft tissue swelling without acute fracture or dislocation.     Dictated by (CST): Dago Ledezma MD on 4/03/2025 at 10:54 AM     Finalized by (CST): Dago Ledezma MD on 4/03/2025 at 10:54 AM          XR TIBIA + FIBULA (2 VIEWS), RIGHT (CPT=73590)    Result Date: 4/3/2025  CONCLUSION:   Degenerative changes and soft tissue swelling without acute fracture or dislocation.      Dictated by (CST): Dago Ledezma MD on 4/03/2025 at 10:52 AM     Finalized by (CST): Dago Ledezma MD on 4/03/2025 at 10:53 AM           EKG 12 Lead    Result Date: 4/6/2025  Normal sinus rhythm Normal ECG No previous ECGs found in [facility] Confirmed by ALEX SHARMA PRATIK ([id number]) on 4/6/2025 9:53:15 AM     Medications:    sodium zirconium cyclosilicate  10 g Oral Q8H    carvedilol  6.25 mg Oral BID with meals    vancomycin  10 mg/kg Intravenous Q24H    silver sulfADIAZINE   Topical Daily    clotrimazole-betamethasone   Topical BID    gabapentin  300 mg Oral TID    [Held by provider] losartan  100 mg Oral Daily    piperacillin-tazobactam  3.375 g Intravenous Q8H    heparin  5,000 Units Subcutaneous Q8H MARCOS    pantoprazole  40 mg Oral QPM        Assessment & Plan:       Cellulitis, unspecified cellulitis site  -Patient initially presented with ongoing and progressive lower extremity swelling, erythema and warmth.  -Patient states he was recently hospitalized and treated for left lower extremity cellulitis.  -Patient noted development of worsening redness and swelling of right lower extremity  -X-rays reviewed.  Noted soft tissue swelling.  - Continuing broad-spectrum antibiotics   -Blood cultures no growth to date  -ID consulted, recommend continuing antibiotics, plan for course of Augmentin and Bactrim as outpatient. appreciate further recommendations  - Continue local wound care as patient will allow.     Reported history of restrictive lung disease  -No current signs of acute exacerbation  -Patient describes some mild dyspnea with exertion  -Continue albuterol inhaler as needed  -Continue Flonase  -Continue to monitor    Acute Kidney Injury with hyperkalemia  -Hyperkalemia increasing  -Without signs of arrhythmia  -Status post hyperkalemia protocol and Lokelma  -Recheck potassium levels  -Patient also recently started on NSAIDs, Vanco and Zosyn  - Continue holding NSAIDs  -Encouraging p.o. intake  - Avoiding Nephrotoxic agents as able  - Cont to monitor       HTN  - elevations noted  - Continue Coreg  - Holding ARB  - Monitor and adjust as needed        History of CLL  -Reports he follows with hematology/oncology as outpatient  -WBC of 66    Plan of care discussed with patient  at bedside . Discussed management/test result(s) with Rn and ID consultant    Quality:  DVT Prophylaxis: Heparin  CODE status: Full  Estimated date of discharge: TBD  Discharge is dependent on: clinical stability    65 minutes spent discussing with other providers, examining patient, obtaining history, reviewing medical records, interpreting and communicating test results/imaging, ordering tests/medications, discussing plan of care and documenting information.      Willam  MD Gianluca          This note was prepared using Dragon Medical voice recognition dictation software. As a result errors may occur. When identified these errors have been corrected. While every attempt is made to correct errors during dictation discrepancies may still exist

## 2025-04-06 NOTE — PLAN OF CARE
Problem: Patient Centered Care  Goal: Patient preferences are identified and integrated in the patient's plan of care  Description: Interventions:- What would you like us to know as we care for you? I usually get my care at the [facility]    - Provide timely, complete, and accurate information to patient/family  - Incorporate patient and family knowledge, values, beliefs, and cultural backgrounds into the planning and delivery of care  - Encourage patient/family to participate in care and decision-making at the level they choose  - Honor patient and family perspectives and choices  Outcome: Progressing     Problem: Patient/Family Goals  Goal: Patient/Family Long Term Goal  Description: Patient's Long Term Goal: Discharge from hospital    Interventions:  - Inpatient wound consult  - Transition to appropriate discharge regimen for antibiotic therapy  - Improve redness to bilateral legs  - See additional Care Plan goals for specific interventions  Outcome: Progressing  Goal: Patient/Family Short Term Goal  Description: Patient's Short Term Goal: Manage cellulitis to legs    Interventions:   - Infectious disease consultation as indicated  - Maintain IV antibiotic therapy as ordered  - Continue dressing changes to legs per routine  - See additional Care Plan goals for specific interventions  Outcome: Progressing     Problem: PAIN - ADULT  Goal: Verbalizes/displays adequate comfort level or patient's stated pain goal  Description: INTERVENTIONS:- Encourage pt to monitor pain and request assistance- Assess pain using appropriate pain scale- Administer analgesics based on type and severity of pain and evaluate response- Implement non-pharmacological measures as appropriate and evaluate response- Consider cultural and social influences on pain and pain management- Manage/alleviate anxiety- Utilize distraction and/or relaxation techniques- Monitor for opioid side effects- Notify MD/LIP if interventions unsuccessful or patient  reports new pain- Anticipate increased pain with activity and pre-medicate as appropriate  Outcome: Progressing     Problem: RISK FOR INFECTION - ADULT  Goal: Absence of fever/infection during anticipated neutropenic period  Description: INTERVENTIONS- Monitor WBC- Administer growth factors as ordered- Implement neutropenic guidelines  Outcome: Progressing     Problem: SAFETY ADULT - FALL  Goal: Free from fall injury  Description: INTERVENTIONS:- Assess pt frequently for physical needs- Identify cognitive and physical deficits and behaviors that affect risk of falls.- [place] fall precautions as indicated by assessment.- Educate pt/family on patient safety including physical limitations- Instruct pt to call for assistance with activity based on assessment- Modify environment to reduce risk of injury- Provide assistive devices as appropriate- Consider OT/PT consult to assist with strengthening/mobility- Encourage toileting schedule  Outcome: Progressing     Problem: DISCHARGE PLANNING  Goal: Discharge to home or other facility with appropriate resources  Description: INTERVENTIONS:- Identify barriers to discharge w/pt and caregiver- Include patient/family/discharge partner in discharge planning- Arrange for needed discharge resources and transportation as appropriate- Identify discharge learning needs (meds, wound care, etc)- Arrange for interpreters to assist at discharge as needed- Consider post-discharge preferences of patient/family/discharge partner- Complete POLST form as appropriate- Assess patient's ability to be responsible for managing their own health- Refer to Case Management Department for coordinating discharge planning if the patient needs post-hospital services based on physician/LIP order or complex needs related to functional status, cognitive ability or social support system  Outcome: Progressing     Problem: SKIN/TISSUE INTEGRITY - ADULT  Goal: Skin integrity remains intact  Description:  INTERVENTIONS- Assess and document risk factors for pressure ulcer development- Assess and document skin integrity- Monitor for areas of redness and/or skin breakdown- Initiate interventions, skin care algorithm/standards of care as needed  Outcome: Progressing  Goal: Incision(s), wounds(s) or drain site(s) healing without S/S of infection  Description: INTERVENTIONS:- Assess and document risk factors for pressure ulcer development- Assess and document skin integrity- Assess and document dressing/incision, wound bed, drain sites and surrounding tissue- Implement wound care per orders- Initiate isolation precautions as appropriate- Initiate Pressure Ulcer prevention bundle as indicated  Outcome: Progressing     Tesfaye was resting up on the recliner, up and ambulating in room with walker and standby assist. He's voiding freely in the bathroom. PRN tylenol was given for pain management. He's on IV ABT, afebrile with no adverse reaction noted. Dressing was reinforced per pt. Plan for discharge tentatively later today when medically stable and per pt's request. Lab result relayed to MD with orders carried out this morning. Pt was also informed. Report given to dayshift nurse.

## 2025-04-07 VITALS
WEIGHT: 277.13 LBS | OXYGEN SATURATION: 96 % | HEIGHT: 70 IN | RESPIRATION RATE: 18 BRPM | DIASTOLIC BLOOD PRESSURE: 81 MMHG | SYSTOLIC BLOOD PRESSURE: 137 MMHG | BODY MASS INDEX: 39.67 KG/M2 | HEART RATE: 83 BPM | TEMPERATURE: 98 F

## 2025-04-07 LAB
ANION GAP SERPL CALC-SCNC: 9 MMOL/L (ref 0–18)
BASOPHILS # BLD AUTO: 0.1 X10(3) UL (ref 0–0.2)
BASOPHILS NFR BLD AUTO: 0.2 %
BUN BLD-MCNC: 22 MG/DL (ref 9–23)
BUN/CREAT SERPL: 18.5 (ref 10–20)
CALCIUM BLD-MCNC: 9.2 MG/DL (ref 8.7–10.4)
CHLORIDE SERPL-SCNC: 100 MMOL/L (ref 98–112)
CO2 SERPL-SCNC: 29 MMOL/L (ref 21–32)
CREAT BLD-MCNC: 1.19 MG/DL
DEPRECATED RDW RBC AUTO: 53.7 FL (ref 35.1–46.3)
EGFRCR SERPLBLD CKD-EPI 2021: 65 ML/MIN/1.73M2 (ref 60–?)
EOSINOPHIL # BLD AUTO: 0.39 X10(3) UL (ref 0–0.7)
EOSINOPHIL NFR BLD AUTO: 0.6 %
ERYTHROCYTE [DISTWIDTH] IN BLOOD BY AUTOMATED COUNT: 16.2 % (ref 11–15)
GLUCOSE BLD-MCNC: 108 MG/DL (ref 70–99)
HCT VFR BLD AUTO: 39.5 %
HGB BLD-MCNC: 12.2 G/DL
IMM GRANULOCYTES # BLD AUTO: 0.2 X10(3) UL (ref 0–1)
IMM GRANULOCYTES NFR BLD: 0.3 %
LYMPHOCYTES # BLD AUTO: 56.96 X10(3) UL (ref 1–4)
LYMPHOCYTES NFR BLD AUTO: 85.6 %
MCH RBC QN AUTO: 28 PG (ref 26–34)
MCHC RBC AUTO-ENTMCNC: 30.9 G/DL (ref 31–37)
MCV RBC AUTO: 90.6 FL
MONOCYTES # BLD AUTO: 2.07 X10(3) UL (ref 0.1–1)
MONOCYTES NFR BLD AUTO: 3.1 %
NEUTROPHILS # BLD AUTO: 6.86 X10 (3) UL (ref 1.5–7.7)
NEUTROPHILS # BLD AUTO: 6.86 X10(3) UL (ref 1.5–7.7)
NEUTROPHILS NFR BLD AUTO: 10.2 %
OSMOLALITY SERPL CALC.SUM OF ELEC: 290 MOSM/KG (ref 275–295)
PLATELET # BLD AUTO: 266 10(3)UL (ref 150–450)
POTASSIUM SERPL-SCNC: 4.8 MMOL/L (ref 3.5–5.1)
RBC # BLD AUTO: 4.36 X10(6)UL
SODIUM SERPL-SCNC: 138 MMOL/L (ref 136–145)
VANCOMYCIN PEAK SERPL-MCNC: 32.1 UG/ML (ref 30–50)
WBC # BLD AUTO: 66.6 X10(3) UL (ref 4–11)

## 2025-04-07 PROCEDURE — 99239 HOSP IP/OBS DSCHRG MGMT >30: CPT | Performed by: INTERNAL MEDICINE

## 2025-04-07 NOTE — PLAN OF CARE
Ready for discharge to home per MDs and therapists. Medications sent with patient to home at discharge. Discharge instructions given and questions answered and supplies given for dressing changes.

## 2025-04-07 NOTE — PROGRESS NOTES
INFECTIOUS DISEASE PROGRESS NOTE  [facility]  part of [facility] ID PROGRESS NOTE    Tesfaye Cobos Patient Status:  Inpatient    1950 MRN [id number]   Location [facility] 1W Attending Willam Hough MD   Hosp Day # 4 PCP None Pcp     Subjective:  ROS reviewed. Was agreeable to wound dressing change yesterday. Afebrile.    ASSESSMENT:    Antibiotics: Vancomycin, zosyn     # Acute RLE cellulitis in setting of BLE wounds   -Blood cx NGTD  # CLL     PLAN:  -  Currenty on vancomycin and zosyn. Will DC on PO augmentin and bactrim x 7 days.  -  Local wound care.   -  Follow fever curve, wbc.  -  Reviewed labs, micro, imaging reports, available old records.  -  Case d/w patient, RN.     History of Present Illness:  Tesfaye Cobos is a 74 year old male with a history of obesity, CLL and reports not currently on treatment, [facility], with a history of hospitalization for LLE cellulitis, who presented to [facility] ED on 4/3 with worsening pain in his legs. States that the wound developed on his right leg about a month ago. Does wear compression wraps. Was putting mupirocin cream on his legs. On arrival, afebrile, XR B tibia with soft tissue swelling,  started on vancomycin and zosyn. ID consulted.    Physical Exam:  /75 (BP Location: Right arm)   Pulse 69   Temp 97.6 °F (36.4 °C) (Oral)   Resp 18   Ht 5' 10\" (1.778 m)   Wt 277 lb 1.6 oz (125.7 kg)   SpO2 95%   BMI 39.76 kg/m²     Gen:   Awake, in bed  HEENT:  EOMI, neck supple  CV/lungs:  RRR, CTAB  Abdom:  Soft, no TTP  Skin/extrem:  BLE wrapped but improved erythema RLE  Lines:  PIV+    Laboratory Data: Reviewed    Microbiology: Reviewed    Radiology: Reviewed      [facility]  [phone]  2025

## 2025-04-07 NOTE — DISCHARGE INSTRUCTIONS
Dressing changes daily.   Follow up with MDs as needed.  Meds for home given prior to discharge.

## 2025-04-07 NOTE — PLAN OF CARE
Up with walker, bilateral lower leg dressings dry and intact. Prescriptions brought up from pharmacy to patient for home and discussed with MD; [facility]     Problem: Patient Centered Care  Goal: Patient preferences are identified and integrated in the patient's plan of care  Description: Interventions:- What would you like us to know as we care for you? I usually get my care at the [facility]    - Provide timely, complete, and accurate information to patient/family  - Incorporate patient and family knowledge, values, beliefs, and cultural backgrounds into the planning and delivery of care  - Encourage patient/family to participate in care and decision-making at the level they choose  - Honor patient and family perspectives and choices  Outcome: Progressing     Problem: Patient/Family Goals  Goal: Patient/Family Long Term Goal  Description: Patient's Long Term Goal: Discharge from hospital    Interventions:  - Inpatient wound consult  - Transition to appropriate discharge regimen for antibiotic therapy  - Improve redness to bilateral legs  - See additional Care Plan goals for specific interventions  Outcome: Progressing  Goal: Patient/Family Short Term Goal  Description: Patient's Short Term Goal: Manage cellulitis to legs    Interventions:   - Infectious disease consultation as indicated  - Maintain IV antibiotic therapy as ordered  - Continue dressing changes to legs per routine  - See additional Care Plan goals for specific interventions  Outcome: Progressing     Problem: PAIN - ADULT  Goal: Verbalizes/displays adequate comfort level or patient's stated pain goal  Description: INTERVENTIONS:- Encourage pt to monitor pain and request assistance- Assess pain using appropriate pain scale- Administer analgesics based on type and severity of pain and evaluate response- Implement non-pharmacological measures as appropriate and evaluate response- Consider cultural and social  influences on pain and pain management- Manage/alleviate anxiety- Utilize distraction and/or relaxation techniques- Monitor for opioid side effects- Notify MD/LIP if interventions unsuccessful or patient reports new pain- Anticipate increased pain with activity and pre-medicate as appropriate  Outcome: Progressing     Problem: RISK FOR INFECTION - ADULT  Goal: Absence of fever/infection during anticipated neutropenic period  Description: INTERVENTIONS- Monitor WBC- Administer growth factors as ordered- Implement neutropenic guidelines  Outcome: Progressing     Problem: SAFETY ADULT - FALL  Goal: Free from fall injury  Description: INTERVENTIONS:- Assess pt frequently for physical needs- Identify cognitive and physical deficits and behaviors that affect risk of falls.- [place] fall precautions as indicated by assessment.- Educate pt/family on patient safety including physical limitations- Instruct pt to call for assistance with activity based on assessment- Modify environment to reduce risk of injury- Provide assistive devices as appropriate- Consider OT/PT consult to assist with strengthening/mobility- Encourage toileting schedule  Outcome: Progressing     Problem: DISCHARGE PLANNING  Goal: Discharge to home or other facility with appropriate resources  Description: INTERVENTIONS:- Identify barriers to discharge w/pt and caregiver- Include patient/family/discharge partner in discharge planning- Arrange for needed discharge resources and transportation as appropriate- Identify discharge learning needs (meds, wound care, etc)- Arrange for interpreters to assist at discharge as needed- Consider post-discharge preferences of patient/family/discharge partner- Complete POLST form as appropriate- Assess patient's ability to be responsible for managing their own health- Refer to Case Management Department for coordinating discharge planning if the patient needs post-hospital services based on physician/LIP order or  complex needs related to functional status, cognitive ability or social support system  Outcome: Progressing     Problem: SKIN/TISSUE INTEGRITY - ADULT  Goal: Skin integrity remains intact  Description: INTERVENTIONS- Assess and document risk factors for pressure ulcer development- Assess and document skin integrity- Monitor for areas of redness and/or skin breakdown- Initiate interventions, skin care algorithm/standards of care as needed  Outcome: Progressing  Goal: Incision(s), wounds(s) or drain site(s) healing without S/S of infection  Description: INTERVENTIONS:- Assess and document risk factors for pressure ulcer development- Assess and document skin integrity- Assess and document dressing/incision, wound bed, drain sites and surrounding tissue- Implement wound care per orders- Initiate isolation precautions as appropriate- Initiate Pressure Ulcer prevention bundle as indicated  Outcome: Progressing

## 2025-04-07 NOTE — PHYSICAL THERAPY NOTE
PHYSICAL THERAPY TREATMENT NOTE - INPATIENT     Room Number: 422/422-A       Presenting Problem: B LE edema and Cellulitis / Tib /fib area ulcers and wounds ---pt reports homebound and has not left his home in months --does not wear shoes or have shoes that he can wear  Co-Morbidities : restricted  lung disease , HTN and CLL    Problem List  Principal Problem:    Cellulitis, unspecified cellulitis site  Active Problems:    Leg wound, left, sequela    Leg wound, right, sequela      PHYSICAL THERAPY ASSESSMENT   Patient demonstrates good  progress this session, goals  remain in progress.      Patient is requiring independent and stand-by assist as a result of the following impairments: decreased endurance/aerobic capacity, pain, impaired standing dynamic balance, decreased muscular endurance, and medical status.     Patient continues to function near baseline with bed mobility, transfers, gait, stair negotiation, maintaining seated position, standing prolonged periods, and performing household tasks.  Next session anticipate patient to progress bed mobility, transfers, gait, stair negotiation, maintaining seated position, standing prolonged periods, and performing household tasks.  Physical Therapy will continue to follow patient for duration of hospitalization.    Patient continues to benefit from continued skilled PT services: with no additional skilled services but increased support at home.    PLAN DURING HOSPITALIZATION  Nursing Mobility Recommendation : 1 Assist  PT Device Recommendation: Rolling walker, Gait belt (has Rollator outside --does not use an AD    \"I do not get dressed --I sit almost naked in the house \"   \"I do not go outside \")  PT Treatment Plan: Bed mobility, Body mechanics, Endurance, Energy conservation, Patient education, Family education, Gait training, Stair training, Transfer training, Balance training  Frequency (Obs): 5x/week     SUBJECTIVE  \"I don't want anyone in my house.\"      OBJECTIVE  Precautions: Bed/chair alarm    PAIN ASSESSMENT   Ratin  Location: B LE not rated in wound area  Management Techniques: Activity promotion, Body mechanics, Breathing techniques, Relaxation, Repositioning    BALANCE  Static Sitting: Normal  Dynamic Sitting: Good  Static Standing: Fair +  Dynamic Standing: Fair    AM-PAC '6-Clicks' INPATIENT SHORT FORM - BASIC MOBILITY  How much difficulty does the patient currently have...  Patient Difficulty: Turning over in bed (including adjusting bedclothes, sheets and blankets)?: None   Patient Difficulty: Sitting down on and standing up from a chair with arms (e.g., wheelchair, bedside commode, etc.): A Little   Patient Difficulty: Moving from lying on back to sitting on the side of the bed?: None   How much help from another person does the patient currently need...   Help from Another: Moving to and from a bed to a chair (including a wheelchair)?: None   Help from Another: Need to walk in hospital room?: A Little   Help from Another: Climbing 3-5 steps with a railing?: A Lot     AM-PAC Score:  Raw Score: 20   Approx Degree of Impairment: 35.83%   Standardized Score (AM-PAC Scale): 47.67   CMS Modifier (G-Code): CJ    FUNCTIONAL ABILITY STATUS  Functional Mobility/Gait Assessment  Gait Assistance: Supervision  Distance (ft): 25ft  Assistive Device: None  Pattern:  (wide base of support, decreased riley speed. Patient ambulating quickly to demonstrate independent with mobility- cues to decrease speed to optimize safety.)  Sit to Stand: independent    Skilled Therapy Provided: Patient received sitting in bedside chair. RN approved activity. Therapist educated pt on POC and physiological benefits of mobilization. Patient initially declining mobility 2* reporting side effects of pain meds. Patient then impulsively stood up and ambulated door distance and back without DME to demonstrate his interdependence with mobility. Discussed home safety techniques to  optimize transition back to home environment. Patient does have 14 stairs to apartment (declined trial this session). Presents with decreased insight into deficits and expressed he did not want home therapy.     The patient's Approx Degree of Impairment: 35.83% has been calculated based on documentation in the [facility] '6 clicks' Inpatient Daily Activity Short Form.  Research supports that patients with this level of impairment may benefit from home.  Final disposition will be made by interdisciplinary medical team    Patient End of Session: With  staff, Call light within reach, RN aware of session/findings    CURRENT GOALS   Goals to be met by: 4/25/25  Patient Goal Patient's self-stated goal is: plan for taking a taxi to [place] as next level of care once DC from     Goal #1 Patient is able to demonstrate supine - sit EOB @ level: modified independent     Goal #1   Current Status NT   Goal #2 Patient is able to demonstrate transfers EOB to/from Chair/Wheelchair at assistance level: modified independent with LRAD     Goal #2  Current Status progressing   Goal #3 Patient is able to ambulate 150 feet with assist device: walker - rolling at assistance level: modified independent   Goal #3   Current Status progressing   Goal #4 Patient will negotiate 14 stairs/one curb w/ assistive device and supervision   Goal #4   Current Status declined   Goal #5 Patient to demonstrate independence with home activity/exercise instructions provided to patient in preparation for discharge.   Goal #5   Current Status ongoing   Therapeutic Activity: 8 minutes

## 2025-04-07 NOTE — DISCHARGE SUMMARY
[facility]  part of [facility]    Discharge Summary    Tesfaye Cobos Patient Status:  Inpatient    1950 MRN [id number]   Location [facility] 4W/SW/SE Attending Willam Hough MD   Hosp Day # 4 PCP None Pcp     Date of Admission: 4/3/2025     Date of Discharge: 25      Lace+ Score: 39  59-90 High Risk  29-58 Medium Risk  0-28   Low Risk.    TCM Follow-Up Recommendation:  LACE 29-58: Moderate Risk of readmission after discharge from the hospital.    DISCHARGE DX: Principal Problem:    Cellulitis, unspecified cellulitis site  Active Problems:    Leg wound, left, sequela    Leg wound, right, sequela       The patient was seen and examined on day of discharge and this discharge summary is in conjunction with any daily progress note from day of discharge.    HPI per admitting physician: \"This is a 74 year oldmale who presented complaining of lower extremity pain and swelling.  Patient states he has been having difficulties with lower extremity swelling and cellulitis.  [facility]  Over the past several days patient believed his right lower extremity was looking worse with increasing redness and pain prompting visit to ED for further evaluation.  Patient denied subjective fevers or chills.  Patient otherwise denies current chest pain, shortness of breath, abdominal pain, nausea vomiting.  Of note, patient has history of CLL for which she follows with hematology oncology as outpatient. \"    Hospital Course:        Cellulitis, unspecified cellulitis site  -Patient initially presented with ongoing and progressive lower extremity swelling, erythema and warmth.  -Patient states he was recently hospitalized and treated for left lower extremity cellulitis.  -Patient noted development of worsening redness and swelling of right lower extremity  -X-rays reviewed.  Noted soft tissue swelling.  - Initially treated with  broad-spectrum antibiotics   -Blood cultures no growth to date  -ID consulted, recommend continuing antibiotics, plan for course of Augmentin and Bactrim as outpatient  - Continue local wound care      Reported history of restrictive lung disease  -No current signs of acute exacerbation  -Patient describes some mild dyspnea with exertion  -Continue albuterol inhaler as needed  -Continue Flonase  -Continue to monitor     Acute Kidney Injury with hyperkalemia  -Resolving  -Without signs of arrhythmia  -Status post hyperkalemia protocol and Lokelma  -Encouraging p.o. intake  - Avoiding Nephrotoxic agents as able  - Cont to monitor      HTN  - elevations noted, improved control  - Restarted ARB as REGINE resolved  - PCP to Monitor and adjust as needed        History of CLL  -Reports he follows with hematology/oncology as outpatient  -WBC of 66      Physical Exam:    Vitals:    04/06/25 1447 04/06/25 2012 04/07/25 0533 04/07/25 0809   BP: (!) 162/86 152/66 (!) 164/82 160/75   BP Location: Right arm Right arm Right arm Right arm   Pulse: 71 80 69    Resp: 16 18 18 18   Temp: 97.8 °F (36.6 °C) 97.2 °F (36.2 °C) 97.6 °F (36.4 °C) 97.6 °F (36.4 °C)   TempSrc: Temporal Oral Oral Oral   SpO2: 96% 99% 100% 95%   Weight:       Height:         No data found.    Intake/Output Summary (Last 24 hours) at 4/7/2025 1015  Last data filed at 4/7/2025 0948  Gross per 24 hour   Intake 540 ml   Output --   Net 540 ml       GENERAL:  Awake and alert, in no acute distress.  HEART:  Regular rhythm.  Regular rate  LUNGS:  Air entry was minimally decreased.  No increased work of breathing or wheezes   ABDOMEN: Soft and non-tender.    EXTREMITIES: Bilateral lower extremity edema.  Dressings in place.  Erythema decreased from demarcated lines  PSYCHIATRIC: Normal mood    CULTURE:   Hospital Encounter on 04/03/25   1. Blood Culture     Status: None (Preliminary result)    Collection Time: 04/03/25  1:34 PM    Specimen: Blood,peripheral   Result Value  Ref Range    Blood Culture Result No Growth 3 Days N/A       IMAGING STUDIES: SOME MAY NEED FOLLOW UP WITH PCP   XR TIBIA + FIBULA (2 VIEWS), LEFT (CPT=73590)    Result Date: 4/3/2025  CONCLUSION: Degenerative changes and soft tissue swelling without acute fracture or dislocation.     Dictated by (CST): Dago Ledezma MD on 4/03/2025 at 10:54 AM     Finalized by (CST): Dago Ledezma MD on 4/03/2025 at 10:54 AM          XR TIBIA + FIBULA (2 VIEWS), RIGHT (CPT=73590)    Result Date: 4/3/2025  CONCLUSION:   Degenerative changes and soft tissue swelling without acute fracture or dislocation.      Dictated by (CST): Dago Ledezma MD on 4/03/2025 at 10:52 AM     Finalized by (CST): Dago Ledezma MD on 4/03/2025 at 10:53 AM           LABS :     Lab Results   Component Value Date    WBC 66.6 (H) 04/07/2025    HGB 12.2 (L) 04/07/2025    HCT 39.5 04/07/2025    .0 04/07/2025    CREATSERUM 1.19 04/07/2025    BUN 22 04/07/2025     04/07/2025    K 4.8 04/07/2025     04/07/2025    CO2 29.0 04/07/2025     (H) 04/07/2025    CA 9.2 04/07/2025    ALB 4.6 04/04/2025    ALKPHO 109 04/04/2025    BILT 0.7 04/04/2025    TP 6.9 04/04/2025    AST 18 04/04/2025    ALT 23 04/04/2025    INR 1.02 04/03/2025    MG 2.2 04/04/2025       Recent Labs   Lab 04/05/25  0523 04/06/25  0508 04/07/25  0516   RBC 4.51 4.35 4.36   HGB 12.6* 12.5* 12.2*   HCT 41.0 39.7 39.5   MCV 90.9 91.3 90.6   MCH 27.9 28.7 28.0   MCHC 30.7* 31.5 30.9*   RDW 16.6* 16.2* 16.2*   NEPRELIM 7.16 7.31 6.86   WBC 63.7* 64.1* 66.6*   .0 247.0 266.0     Recent Labs   Lab 04/04/25  0752 04/05/25  0523 04/06/25  0508 04/07/25  0516   * 110* 110* 108*   BUN 19 19 17 22   CREATSERUM 1.25 1.33* 1.19 1.19   CA 9.4 9.1 9.1 9.2   ALB 4.6  --   --   --     136 135* 138   K 5.0 5.4* 6.5* 4.8    102 103 100   CO2 27.0 25.0 24.0 29.0   ALKPHO 109  --   --   --    AST 18  --   --   --    ALT 23  --   --   --    BILT 0.7  --   --   --    TP 6.9  --    --   --      Lab Results   Component Value Date    INR 1.02 04/03/2025       Disposition: Discharge to Home    Condition at Discharge: Stable     Discharge Medications:      Discharge Medications        START taking these medications        Instructions Prescription details   amoxicillin clavulanate 875-125 MG Tabs  Commonly known as: Augmentin      Take 1 tablet by mouth 2 (two) times daily for 7 days.   Stop taking on: April 13, 2025  Quantity: 14 tablet  Refills: 0     clotrimazole-betamethasone 1-0.05 % Crea  Commonly known as: Lotrisone      Apply 1 Application topically 2 (two) times daily.   Quantity: 45 g  Refills: 0     sulfamethoxazole-trimethoprim -160 MG Tabs per tablet  Commonly known as: Bactrim DS      Take 1 tablet by mouth 2 (two) times daily for 7 days.   Stop taking on: April 13, 2025  Quantity: 14 tablet  Refills: 0            CONTINUE taking these medications        Instructions Prescription details   acetaminophen 500 MG Tabs  Commonly known as: Tylenol Extra Strength      Take 1 tablet (500 mg total) by mouth 3 (three) times daily.   Refills: 0     albuterol 108 (90 Base) MCG/ACT Aers  Commonly known as: Ventolin HFA      Inhale 2 puffs into the lungs 3 (three) times daily as needed for Wheezing.   Refills: 0     CALCIUM 500 OR      Take 1,000 mg by mouth every evening.   Refills: 0     Carboxymethylcellulose Sod PF 0.5 % Soln      Apply 1 drop to eye as needed. INSTILL 1 DROP IN BOTH EYES AS DIRECTED FOR DRY EYES   Refills: 0     Cholecalciferol 50 MCG (2000 UT) Tabs      Take 2 tablets (4,000 Units total) by mouth daily.   Refills: 0     cyclobenzaprine 10 MG Tabs  Commonly known as: Flexeril      Take 1 tablet (10 mg total) by mouth nightly as needed.   Refills: 0     diclofenac 50 MG Tbec  Commonly known as: Voltaren      Take 1 tablet (50 mg total) by mouth 2 (two) times daily as needed.   Refills: 0     fluticasone propionate 50 MCG/ACT Susp  Commonly known as: Flonase      2  sprays by Nasal route daily.   Refills: 0     gabapentin 300 MG Caps  Commonly known as: Neurontin      Take 1 capsule (300 mg total) by mouth 3 (three) times daily. TAKE ONE CAPSULE BY MOUTH TWICE A DAY AND TAKE TWO CAPSULES AT BEDTIME FOR NERVE PAIN ( 1 CAPSULE IN THE MORNING AND AT NOON AND 2 CAPSULES AT BEDTIME )   Refills: 0     LORazepam 1 MG Tabs  Commonly known as: Ativan      Take 1 tablet (1 mg total) by mouth every 6 (six) hours as needed for Anxiety.   Refills: 0     Omeprazole 20 MG Tbec      Take 20 mg by mouth every evening.   Refills: 0     triamcinolone 0.1 % Crea  Commonly known as: Kenalog      Apply 1 Application topically 2 (two) times daily. APPLY SMALL AMOUNT TOPICALLY TWICE A DAY FOR SKIN INFLAMMATION TO NON ULCERATED AREA ON RIGHT LOWER EXTREMITY AND NON ULCAERATED AREA ON LEFT LOWER EXTREMITY   Refills: 0     valsartan 160 MG Tabs  Commonly known as: Diovan      Take 1 tablet (160 mg total) by mouth daily.   Refills: 0     Vitamin B Complex Caps      Take 1 capsule by mouth every evening.   Refills: 0               Where to Get Your Medications        These medications were sent to [facility] - [address] [phone], [phone]  [address] [place] [address] [address], [facility] [id number]      Phone: [phone]   amoxicillin clavulanate 875-125 MG Tabs  clotrimazole-betamethasone 1-0.05 % Crea  sulfamethoxazole-trimethoprim -160 MG Tabs per tablet         Follow up Visits  Tunde Nicolas MD  [address]  [address] [address] [phone]    Call  As needed, If symptoms worsen    None Pcp    Consultants         Provider   Role Specialty     Tunde Nicolas MD      Consulting Physician INFECTIOUS DISEASES              Other Discharge Instructions:   Discharge References/Attachments    Cellulitis, Discharge Instructions for (English)         ----------------------------------------------------  36  MIN SPENT ON THIS DC   Willam Hough MD    4/7/2025

## 2025-04-07 NOTE — PLAN OF CARE
Problem: Patient Centered Care  Goal: Patient preferences are identified and integrated in the patient's plan of care  Description: Interventions:- What would you like us to know as we care for you? I usually get [facility] at the [facility]    - Provide timely, complete, and accurate information to patient/family  - Incorporate patient and family knowledge, values, beliefs, and cultural backgrounds into the planning and delivery of care  - Encourage patient/family to participate in care and decision-making at the level they choose  - Honor patient and family perspectives and choices  Outcome: Progressing     Problem: Patient/Family Goals  Goal: Patient/Family Long Term Goal  Description: Patient's Long Term Goal: Discharge from hospital    Interventions:  - Inpatient wound consult  - Transition to appropriate discharge regimen for antibiotic therapy  - Improve redness to bilateral legs  - See additional Care Plan goals for specific interventions  Outcome: Progressing  Goal: Patient/Family Short Term Goal  Description: Patient's Short Term Goal: Manage cellulitis to legs    Interventions:   - Infectious disease consultation as indicated  - Maintain IV antibiotic therapy as ordered  - Continue dressing changes to legs per routine  - See additional Care Plan goals for specific interventions  Outcome: Progressing     Problem: PAIN - ADULT  Goal: Verbalizes/displays adequate comfort level or patient's stated pain goal  Description: INTERVENTIONS:- Encourage pt to monitor pain and request assistance- Assess pain using appropriate pain scale- Administer analgesics based on type and severity of pain and evaluate response- Implement non-pharmacological measures as appropriate and evaluate response- Consider cultural and social influences on pain and pain management- Manage/alleviate anxiety- Utilize distraction and/or relaxation techniques- Monitor for opioid side effects- Notify MD/LIP if interventions unsuccessful or patient  reports new pain- Anticipate increased pain with activity and pre-medicate as appropriate  Outcome: Progressing     Problem: RISK FOR INFECTION - ADULT  Goal: Absence of fever/infection during anticipated neutropenic period  Description: INTERVENTIONS- Monitor WBC- Administer growth factors as ordered- Implement neutropenic guidelines  Outcome: Progressing     Problem: SAFETY ADULT - FALL  Goal: Free from fall injury  Description: INTERVENTIONS:- Assess pt frequently for physical needs- Identify cognitive and physical deficits and behaviors that affect risk of falls.- [place] fall precautions as indicated by assessment.- Educate pt/family on patient safety including physical limitations- Instruct pt to call for assistance with activity based on assessment- Modify environment to reduce risk of injury- Provide assistive devices as appropriate- Consider OT/PT consult to assist with strengthening/mobility- Encourage toileting schedule  Outcome: Progressing     Problem: DISCHARGE PLANNING  Goal: Discharge to home or other facility with appropriate resources  Description: INTERVENTIONS:- Identify barriers to discharge w/pt and caregiver- Include patient/family/discharge partner in discharge planning- Arrange for needed discharge resources and transportation as appropriate- Identify discharge learning needs (meds, wound care, etc)- Arrange for interpreters to assist at discharge as needed- Consider post-discharge preferences of patient/family/discharge partner- Complete POLST form as appropriate- Assess patient's ability to be responsible for managing their own health- Refer to Case Management Department for coordinating discharge planning if the patient needs post-hospital services based on physician/LIP order or complex needs related to functional status, cognitive ability or social support system  Outcome: Progressing     Problem: SKIN/TISSUE INTEGRITY - ADULT  Goal: Skin integrity remains intact  Description:  INTERVENTIONS- Assess and document risk factors for pressure ulcer development- Assess and document skin integrity- Monitor for areas of redness and/or skin breakdown- Initiate interventions, skin care algorithm/standards of care as needed  Outcome: Progressing  Goal: Incision(s), wounds(s) or drain site(s) healing without S/S of infection  Description: INTERVENTIONS:- Assess and document risk factors for pressure ulcer development- Assess and document skin integrity- Assess and document dressing/incision, wound bed, drain sites and surrounding tissue- Implement wound care per orders- Initiate isolation precautions as appropriate- Initiate Pressure Ulcer prevention bundle as indicated  Outcome: Progressing     Tesfaye was resting on the recliner during shift, ambulating well in room with a walker, and voiding freely in the bathroom. Pain managed with PRN Oxy. Pt on IV ABT, afebrile with no adverse reaction noted. Pt refused any dressing change at this time. Plan for discharge back to home with oral ABT when medically stable.

## 2025-04-08 ENCOUNTER — PATIENT OUTREACH (OUTPATIENT)
Dept: CASE MANAGEMENT | Age: 75
End: 2025-04-08

## 2025-04-08 NOTE — PROGRESS NOTES
Follows an outside provider out of [facility]. He will call and schedule an appointment.    Dr. Sharmin Zavala MD  [address] [place] [id number]  [facility]

## 2025-04-11 ENCOUNTER — PATIENT OUTREACH (OUTPATIENT)
Dept: CASE MANAGEMENT | Age: 75
End: 2025-04-11

## 2025-04-11 NOTE — PROCEDURES
The office order for PCP removal request is Approved and finalized on April 11, 2025.    Added Sharmin Zavala MD as the patient's Primary Care Physician

## 2025-04-22 ENCOUNTER — TELEPHONE (OUTPATIENT)
Dept: WOUND CARE | Facility: HOSPITAL | Age: 75
End: 2025-04-22